# Patient Record
Sex: FEMALE | Race: WHITE | NOT HISPANIC OR LATINO | Employment: OTHER | URBAN - METROPOLITAN AREA
[De-identification: names, ages, dates, MRNs, and addresses within clinical notes are randomized per-mention and may not be internally consistent; named-entity substitution may affect disease eponyms.]

---

## 2023-04-06 DIAGNOSIS — M81.0 AGE-RELATED OSTEOPOROSIS WITHOUT CURRENT PATHOLOGICAL FRACTURE: Primary | ICD-10-CM

## 2023-04-07 RX ORDER — ALENDRONATE SODIUM 70 MG/1
TABLET ORAL
Qty: 12 TABLET | Refills: 2 | Status: SHIPPED | OUTPATIENT
Start: 2023-04-07 | End: 2023-12-11 | Stop reason: SDUPTHER

## 2023-07-12 ENCOUNTER — TELEPHONE (OUTPATIENT)
Dept: PRIMARY CARE | Facility: CLINIC | Age: 78
End: 2023-07-12
Payer: MEDICARE

## 2023-07-12 DIAGNOSIS — R15.9 INCONTINENCE OF FECES, UNSPECIFIED FECAL INCONTINENCE TYPE: Primary | ICD-10-CM

## 2023-08-06 ENCOUNTER — DOCUMENTATION (OUTPATIENT)
Dept: PRIMARY CARE | Facility: CLINIC | Age: 78
End: 2023-08-06
Payer: MEDICARE

## 2023-08-06 DIAGNOSIS — U07.1 COVID-19 VIRUS INFECTION: Primary | ICD-10-CM

## 2023-08-06 NOTE — PROGRESS NOTES
Pt tested positive for covid 19 this morning   Symptomatic Rx  with analgesics/ antipyretics and decongestants for sx relief.  Sx course, recovery and residual sx  ( lingering cough , fatigue etc.,) discussed.   Paxlovid discussed, MC SEs and to hold statin during duration of rx advised.   Renal function reviewed.  Discussed the sx , course and worsening sx that would need immediate attention requiring ER visit and hospitalization if needed    Discussed COVID-19 Pandemic best practices for avoidance,  wearing mask when leave the home, only leaving home for essential reasons, maintain social distancing, etc.     Pt's prime concern was her  who has MG and is under Rx .   At this time he does not have sx . Advised her to quarantine for 5- 7 days  and be on high alert for onset of sx for him .

## 2023-08-18 ENCOUNTER — TELEPHONE (OUTPATIENT)
Dept: PRIMARY CARE | Facility: CLINIC | Age: 78
End: 2023-08-18
Payer: MEDICARE

## 2023-08-18 DIAGNOSIS — B34.9 VIRAL SYNDROME: Primary | ICD-10-CM

## 2023-08-18 RX ORDER — METHYLPREDNISOLONE 4 MG/1
TABLET ORAL
Qty: 21 TABLET | Refills: 0 | Status: SHIPPED | OUTPATIENT
Start: 2023-08-18 | End: 2023-08-18 | Stop reason: SDUPTHER

## 2023-08-18 RX ORDER — METHYLPREDNISOLONE 4 MG/1
TABLET ORAL
Qty: 21 TABLET | Refills: 0 | Status: SHIPPED | OUTPATIENT
Start: 2023-08-18 | End: 2023-08-25

## 2023-09-19 ENCOUNTER — HOSPITAL ENCOUNTER (OUTPATIENT)
Dept: DATA CONVERSION | Facility: HOSPITAL | Age: 78
Discharge: HOME | End: 2023-09-19

## 2023-09-28 DIAGNOSIS — R15.2 INCONTINENCE OF FECES WITH FECAL URGENCY: Primary | ICD-10-CM

## 2023-09-28 DIAGNOSIS — R15.9 INCONTINENCE OF FECES WITH FECAL URGENCY: Primary | ICD-10-CM

## 2023-09-28 DIAGNOSIS — N39.46 URINARY INCONTINENCE, MIXED: ICD-10-CM

## 2023-10-05 DIAGNOSIS — N39.46 MIXED STRESS AND URGE URINARY INCONTINENCE: Primary | ICD-10-CM

## 2023-10-31 ENCOUNTER — TREATMENT (OUTPATIENT)
Dept: PHYSICAL THERAPY | Facility: CLINIC | Age: 78
End: 2023-10-31
Payer: MEDICARE

## 2023-10-31 DIAGNOSIS — N39.46 URINARY INCONTINENCE, MIXED: Primary | ICD-10-CM

## 2023-10-31 DIAGNOSIS — N39.46 MIXED STRESS AND URGE URINARY INCONTINENCE: ICD-10-CM

## 2023-10-31 DIAGNOSIS — R15.2 INCONTINENCE OF FECES WITH FECAL URGENCY: ICD-10-CM

## 2023-10-31 DIAGNOSIS — R15.9 INCONTINENCE OF FECES WITH FECAL URGENCY: ICD-10-CM

## 2023-10-31 PROCEDURE — 97530 THERAPEUTIC ACTIVITIES: CPT | Mod: GP

## 2023-10-31 ASSESSMENT — PAIN - FUNCTIONAL ASSESSMENT: PAIN_FUNCTIONAL_ASSESSMENT: 0-10

## 2023-10-31 ASSESSMENT — ENCOUNTER SYMPTOMS
DEPRESSION: 0
LOSS OF SENSATION IN FEET: 0
OCCASIONAL FEELINGS OF UNSTEADINESS: 0

## 2023-10-31 ASSESSMENT — PAIN SCALES - GENERAL: PAINLEVEL_OUTOF10: 0 - NO PAIN

## 2023-10-31 NOTE — PROGRESS NOTES
Physical Therapy Treatment    Patient Name: Jeanette Del Toro  MRN: [id number]  Today's Date: 10/31/2023  Time Calculation  Start Time: 1520  Stop Time: 1630  Time Calculation (min): 70 min  PT Therapeutic Procedures Time Entry  Therapeutic Activity Time Entry: 55  Visit # 2/10    Current Problem   1. Urinary incontinence, mixed        2. Mixed stress and urge urinary incontinence  Follow Up In Physical Therapy    Follow Up In Physical Therapy      3. Incontinence of feces with fecal urgency  Follow Up In Physical Therapy          Subjective   General    Nocturia continues 3x most nights. Still voids every 2-3 hours during the day. Sometimes more in morning.  Fiber 23-30 grams per day from food  Fecal smearing happening maybe 3-4x/week  PCP recommended Colace and fiber gummies. Seemed to make stool too soft.   She has increased water a little bit. Added a glass of water with dinner (10-12 oz)    Precautions:  Precautions  STEADI Fall Risk Score (The score of 4 or more indicates an increased risk of falling): 0  Pain   Pain Assessment: 0-10  Pain Score: 0 - No pain  Post Treatment Pain Level 0    Objective   Internal assessment of pelvic floor muscles (vaginal) completed in supine position with patient consent:  Observation:  Vulvar tissues mildly erythematic with moderate dryness c/w postmenopausal status, labia minora reabsorption  no perineal excursion with diaphragmatic breath,visible lift with cues with +adductor accessory, delayed relaxation, +bulge with cough    Internal:  Superficial layer mm   ++tender bilat bulbocav, STP  No tension noted    Deep layer:  ++tender R>L PC, OI  Min tension L>R    Muscle Excursion  Good - lift/ascent, fair descent    Strength  3+/5, delayed/difficult relaxation after contract    Prolapse  None observed; not formally assessed this date     Internal (rectal) assessment of pelvic floor mm in prone position with patient consent:    Observation: no perianal irritation    Anal wink  reflex: NT  External Anal spincter:  Pain no  Tone hypo  Strength fair  Relaxation WNL  Coordination WNL    Coccyx: hypomobile     Treatments:     Therapeutic Activity  Therapeutic Activity Performed: Yes  Therapeutic Activity 1: Review of eval findings from 9/19 and noted updates.  Therapeutic Activity 2: Discussed pt's fiber journal and management of stool consistency. Pt's PCP had previously recommended Colace as a stool softener and PT/patient discuss potential benefits of resuming this. Pt is is getting adequat fiber intake from diet (23-30 g daily) so advised she may want to hold off on fiber gummies at this time.  Therapeutic Activity 3: Internal (vaginal and rectal) pelvic floor mm assessments completed as noted above and pt educated in findings.       Assessment   Internal assessment of pfm reveals considerable pfm tenderness and delayed relaxation, as well as EAS weakness. Pt will benefit from continued strengthening of pfm being mindful of resting tone. Patient's postural deficits are likely contributing factors as well.        Plan:    Focus on breathwork and exercise next session. Review bowel emptying strategies.      Goals:   Active       PT LTGs       demos pelvic floor mm strength at least 3/5 with normal/complete relaxation after contract for improved continence of bladder and bowel        Start:  09/28/23    Expected End:  12/29/23            bart stool consistency 2-4  for improved efficiency of holding stool        Start:  09/28/23    Expected End:  12/29/23            bart I with progressed home exercise program with proper breath coordination for iAP management        Start:  09/28/23    Expected End:  12/29/23            report episodes of UI and FI reduced by 75% or more        Start:  09/28/23    Expected End:  12/29/23            report nocturia 2x or less       Start:  09/28/23    Expected End:  12/29/23                   PT Raulito arriaga I with exercises issued thus far with proper  breath coordination and no UI        Start:  09/28/23    Expected End:  10/31/23            consistently demos pelvic floor mm excursion in coordination with breath cycle        Start:  09/28/23    Expected End:  10/31/23            report sucess with urge suppression strategies 50% of the time with bowel/bladder urgency       Start:  09/28/23    Expected End:  10/31/23

## 2023-11-09 ENCOUNTER — TREATMENT (OUTPATIENT)
Dept: PHYSICAL THERAPY | Facility: CLINIC | Age: 78
End: 2023-11-09
Payer: MEDICARE

## 2023-11-09 DIAGNOSIS — N39.46 URINARY INCONTINENCE, MIXED: Primary | ICD-10-CM

## 2023-11-09 DIAGNOSIS — N39.46 MIXED STRESS AND URGE URINARY INCONTINENCE: ICD-10-CM

## 2023-11-09 DIAGNOSIS — R15.9 INCONTINENCE OF FECES WITH FECAL URGENCY: ICD-10-CM

## 2023-11-09 DIAGNOSIS — R15.2 INCONTINENCE OF FECES WITH FECAL URGENCY: ICD-10-CM

## 2023-11-09 PROCEDURE — 97530 THERAPEUTIC ACTIVITIES: CPT | Mod: GP

## 2023-11-09 PROCEDURE — 97110 THERAPEUTIC EXERCISES: CPT | Mod: GP

## 2023-11-09 ASSESSMENT — PAIN SCALES - GENERAL: PAINLEVEL_OUTOF10: 0 - NO PAIN

## 2023-11-09 ASSESSMENT — PAIN - FUNCTIONAL ASSESSMENT: PAIN_FUNCTIONAL_ASSESSMENT: 0-10

## 2023-11-09 NOTE — PROGRESS NOTES
Physical Therapy Treatment    Patient Name: Jeanette Del Toro  MRN: [id number]  Today's Date: 11/9/2023  Time Calculation  Start Time: 0930  Stop Time: 1030  Time Calculation (min): 60 min  PT Therapeutic Procedures Time Entry  Therapeutic Exercise Time Entry: 30  Therapeutic Activity Time Entry: 25  Visit # 3/10    Current Problem   1. Urinary incontinence, mixed  Follow Up In Physical Therapy      2. Mixed stress and urge urinary incontinence  Follow Up In Physical Therapy      3. Incontinence of feces with fecal urgency  Follow Up In Physical Therapy          Subjective   General    Taking Colace 1x/day. Her typical stool type is [place] 1. With Colace she is [place] 3-4 and very large. Pt finds this upsetting because they are so large and she stoped up the toilet.  She did not take Colace past 2 days because she had somewhere to be in the morning.   Nocturia 3-4x. This may be in part due to spouse waking during the night. Just a few sips of water every hour in evening.     Precautions:  Precautions  STEADI Fall Risk Score (The score of 4 or more indicates an increased risk of falling): 0  Pain   Pain Assessment: 0-10  Pain Score: 0 - No pain  Post Treatment Pain Level 0    Objective   Excellent exercise technique and breath strategies    Treatments:  Therapeutic Exercise  Therapeutic Exercise Performed: Yes  Therapeutic Exercise Activity 1: diaphragmatic breathing x10  Therapeutic Exercise Activity 2: diaphragmatic breathing with pfm drop x10  Therapeutic Exercise Activity 3: cobbler pose with diaphragmatic breathing  Therapeutic Exercise Activity 4: windshield wiper  Therapeutic Exercise Activity 5: cat cow x10  Therapeutic Exercise Activity 6: child's pose  Therapeutic Exercise Activity 7: horizontal abduction with green band supine and seated x10    Therapeutic Activity  Therapeutic Activity Performed: Yes  Therapeutic Activity 1: Lengthy review of patient's updated bowel movements with colace. Reviewed  "[place] chart and discussed \"normal\" consistency, toilet positioning, length of time sitting on toilet. Pt and PT agree pt will continue with Colace daily for now and monitor size, consistency and frequency of BMs.  Therapeutic Activity 2: Reviwed nocturia habits, including evening fluid intake.           Assessment   Assessment:    Pt demos excellent carryover with exercises and asks insightful questions. Pt appears to be having excellent results with stool softener, high fiber diet and exercise. She has concerns about size of stool, but agrees to stick with current plan and monitor for changes. Exercises currently focused on pfm relaxation for decreased bladder irritation and improved bowel emptying.    Plan:    Will reassess pfm next session.    OP EDUCATION:       Goals:   Active       PT LTGs       demos pelvic floor mm strength at least 3/5 with normal/complete relaxation after contract for improved continence of bladder and bowel        Start:  09/28/23    Expected End:  12/29/23            demos stool consistency 2-4  for improved efficiency of holding stool        Start:  09/28/23    Expected End:  12/29/23            demos I with progressed home exercise program with proper breath coordination for iAP management        Start:  09/28/23    Expected End:  12/29/23            report episodes of UI and FI reduced by 75% or more        Start:  09/28/23    Expected End:  12/29/23            report nocturia 2x or less       Start:  09/28/23    Expected End:  12/29/23                   PT STGs       demos I with exercises issued thus far with proper breath coordination and no UI  (Met)       Start:  09/28/23    Expected End:  10/31/23    Resolved:  11/09/23         consistently demos pelvic floor mm excursion in coordination with breath cycle  (Progressing)       Start:  09/28/23    Expected End:  11/30/23            report sucess with urge suppression strategies 50% of the time with bowel/bladder urgency " (Progressing)       Start:  09/28/23    Expected End:  11/30/23

## 2023-11-16 ENCOUNTER — TREATMENT (OUTPATIENT)
Dept: PHYSICAL THERAPY | Facility: CLINIC | Age: 78
End: 2023-11-16
Payer: MEDICARE

## 2023-11-16 DIAGNOSIS — N39.46 URINARY INCONTINENCE, MIXED: Primary | ICD-10-CM

## 2023-11-16 DIAGNOSIS — R15.2 INCONTINENCE OF FECES WITH FECAL URGENCY: ICD-10-CM

## 2023-11-16 DIAGNOSIS — N39.46 MIXED STRESS AND URGE URINARY INCONTINENCE: ICD-10-CM

## 2023-11-16 DIAGNOSIS — R15.9 INCONTINENCE OF FECES WITH FECAL URGENCY: ICD-10-CM

## 2023-11-16 PROCEDURE — 97530 THERAPEUTIC ACTIVITIES: CPT | Mod: GP

## 2023-11-16 PROCEDURE — 97110 THERAPEUTIC EXERCISES: CPT | Mod: GP

## 2023-11-16 ASSESSMENT — PAIN - FUNCTIONAL ASSESSMENT: PAIN_FUNCTIONAL_ASSESSMENT: 0-10

## 2023-11-16 ASSESSMENT — PAIN SCALES - GENERAL: PAINLEVEL_OUTOF10: 0 - NO PAIN

## 2023-11-16 NOTE — PROGRESS NOTES
"Physical Therapy Treatment    Patient Name: Jeanette Del Toro  MRN: [id number]  Today's Date: 11/16/2023  Time Calculation  Start Time: 0935  Stop Time: 1030  Time Calculation (min): 55 min  PT Therapeutic Procedures Time Entry  Therapeutic Exercise Time Entry: 30  Therapeutic Activity Time Entry: 23  Visit # 4/10    Current Problem   1. Urinary incontinence, mixed        2. Mixed stress and urge urinary incontinence  Follow Up In Physical Therapy      3. Incontinence of feces with fecal urgency            Subjective   General    Patient reports she is \"good.\" She continues to have larger bowel movements more often. When this happens, it is only 1x/day. Still having some days when BM is smaller and more frequent. This only happened 1-2x in past week. Thinks stool softener is helping.  Has not noted a big change with bladder, maybe longer stretches of sleep during the night. Intermittent success with urge suppression strategies for bladder.  Patient defers internal re-assessment this date.  Precautions:  Precautions  STEADI Fall Risk Score (The score of 4 or more indicates an increased risk of falling): 0  Pain   Pain Assessment: 0-10  Pain Score: 0 - No pain  Post Treatment Pain Level 0    Objective   Excellent exercise technique    Treatments:  Therapeutic Exercise  Therapeutic Exercise Performed: Yes  Therapeutic Exercise Activity 1: sidelying clam/reverse clam without resistance  Therapeutic Exercise Activity 2: sidelying open book x10 bilat  Therapeutic Exercise Activity 3: cobbler pose with diaphragmatic breathing  Therapeutic Exercise Activity 4: windshield wiper  Therapeutic Exercise Activity 5: cat cow x10  Therapeutic Exercise Activity 6: child's pose  Therapeutic Exercise Activity 7: horizontal abduction with green band supine and seated x10    Therapeutic Activity  Therapeutic Activity Performed: Yes  Therapeutic Activity 1: Review of bowel function, impact of stool softener  Therapeutic Activity 2: " Educated pt re: purpose of re-assessing pelvic floor internally. Reviewed impact of mm tension on bladder/bowel fuction and mm tenderness        Assessment   Assessment:    Pt demos and verbalizes excellent understanding of purpose of exercises and behavioral modification. Bowels are functioning better/ more regular, which should have positive impact on bladder. Pt will benefit from pfm re-assessment, then progression of exercises based on findings.     Plan:    Plan internal re-assessment next session. Review all goals. Review urge suppression /daytime voiding intervals.    OP EDUCATION:   As above    Goals:   Active       PT LTGs       demos pelvic floor mm strength at least 3/5 with normal/complete relaxation after contract for improved continence of bladder and bowel        Start:  09/28/23    Expected End:  12/29/23            demos stool consistency 2-4  for improved efficiency of holding stool        Start:  09/28/23    Expected End:  12/29/23            demos I with progressed home exercise program with proper breath coordination for iAP management        Start:  09/28/23    Expected End:  12/29/23            report episodes of UI and FI reduced by 75% or more        Start:  09/28/23    Expected End:  12/29/23            report nocturia 2x or less       Start:  09/28/23    Expected End:  12/29/23                   PT STGs       demos I with exercises issued thus far with proper breath coordination and no UI  (Met)       Start:  09/28/23    Expected End:  10/31/23    Resolved:  11/09/23         consistently demos pelvic floor mm excursion in coordination with breath cycle  (Progressing)       Start:  09/28/23    Expected End:  11/30/23            report sucess with urge suppression strategies 50% of the time with bowel/bladder urgency (Progressing)       Start:  09/28/23    Expected End:  11/30/23

## 2023-11-20 ENCOUNTER — TREATMENT (OUTPATIENT)
Dept: PHYSICAL THERAPY | Facility: CLINIC | Age: 78
End: 2023-11-20
Payer: MEDICARE

## 2023-11-20 DIAGNOSIS — R15.2 INCONTINENCE OF FECES WITH FECAL URGENCY: ICD-10-CM

## 2023-11-20 DIAGNOSIS — N39.46 URINARY INCONTINENCE, MIXED: Primary | ICD-10-CM

## 2023-11-20 DIAGNOSIS — R15.9 INCONTINENCE OF FECES WITH FECAL URGENCY: ICD-10-CM

## 2023-11-20 DIAGNOSIS — N39.46 MIXED STRESS AND URGE URINARY INCONTINENCE: ICD-10-CM

## 2023-11-20 DIAGNOSIS — M81.0 AGE-RELATED OSTEOPOROSIS WITHOUT CURRENT PATHOLOGICAL FRACTURE: ICD-10-CM

## 2023-11-20 PROCEDURE — 97530 THERAPEUTIC ACTIVITIES: CPT | Mod: GP

## 2023-11-20 PROCEDURE — 97140 MANUAL THERAPY 1/> REGIONS: CPT | Mod: GP

## 2023-11-20 PROCEDURE — 97110 THERAPEUTIC EXERCISES: CPT | Mod: GP

## 2023-11-20 ASSESSMENT — PAIN SCALES - GENERAL: PAINLEVEL_OUTOF10: 0 - NO PAIN

## 2023-11-20 ASSESSMENT — PAIN - FUNCTIONAL ASSESSMENT: PAIN_FUNCTIONAL_ASSESSMENT: 0-10

## 2023-11-20 NOTE — PROGRESS NOTES
Physical Therapy Treatment    Patient Name: Jeanette Del Toro  MRN: [id number]  Today's Date: 11/20/2023  Time Calculation  Start Time: 0930  Stop Time: 1030  Time Calculation (min): 60 min  PT Therapeutic Procedures Time Entry  Manual Therapy Time Entry: 5  Therapeutic Exercise Time Entry: 15  Therapeutic Activity Time Entry: 35  Visit # 5/10    Current Problem   1. Urinary incontinence, mixed        2. Mixed stress and urge urinary incontinence  Follow Up In Physical Therapy      3. Incontinence of feces with fecal urgency            Subjective   General    No significant changes since here last. She is having some success with urge suppression strategies. She has been going a little longer at night between voids (up to 4.5 hours last night). Daytime voiding intervals seem to vary, but definitely more frequent in the morning when she drinks more.  Leaking urine (small) on her way to bathroom.  Bowel movements are still large/more normal consistency, but now every day or every other. No smears/stains last week.    Precautions:  Precautions  STEADI Fall Risk Score (The score of 4 or more indicates an increased risk of falling): 0  Pain   Pain Assessment: 0-10  Pain Score: 0 - No pain  Post Treatment Pain Level 0    Objective   Internal assessment of pelvic floor muscles (vaginal) completed in supine position with patient consent.    Observation:  Vulvar tissue pink with mod dryness    Internal:  Superficial layer mm   +tender R>L bulbo/ischio    Deep layer:  +tender OI bilat L>R    Muscle Excursion  Good- lift/descent    Strength  3/5, good relaxation with cues    Prolapse  Not observed     Treatments:  Therapeutic Exercise  Therapeutic Exercise Performed: Yes  Therapeutic Exercise Activity 1: isolated pfm contraction with focus on both anterior and posterior pelvic floor  Therapeutic Exercise Activity 2: clam (green)/reverse clam  Therapeutic Exercise Activity 3: bridge with abduct moment and folcus  on    Therapeutic Activity  Therapeutic Activity Performed: Yes  Therapeutic Activity 1: review of short term goals, educated pt re: walker         Manual Therapy  Manual Therapy Performed: Yes  Manual Therapy Activity 1: brief ttp release L>R OI, variable hip rotation      Assessment   Assessment:    Progressing towards goals. Bowel emptying improved and less fecal smearing noted. Continues to have urinary urgency and UUI. Pelvic floor mm excursion improved overall, but still has first layer tenderness and OI irritation, which could be contributing to bladder irritation.    Plan:    Progress hip strengthening/pelvic floor strengthening    OP EDUCATION:   [place] daytime voiding intervals.    Goals:   Active       PT LTGs       demos pelvic floor mm strength at least 3/5 with normal/complete relaxation after contract for improved continence of bladder and bowel        Start:  09/28/23    Expected End:  12/29/23            demos stool consistency 2-4  for improved efficiency of holding stool        Start:  09/28/23    Expected End:  12/29/23            demos I with progressed home exercise program with proper breath coordination for iAP management        Start:  09/28/23    Expected End:  12/29/23            report episodes of UI and FI reduced by 75% or more        Start:  09/28/23    Expected End:  12/29/23            report nocturia 2x or less       Start:  09/28/23    Expected End:  12/29/23                   PT STGs       demos I with exercises issued thus far with proper breath coordination and no UI  (Met)       Start:  09/28/23    Expected End:  10/31/23    Resolved:  11/09/23         consistently demos pelvic floor mm excursion in coordination with breath cycle  (Met)       Start:  09/28/23    Expected End:  11/30/23    Resolved:  11/20/23         report sucess with urge suppression strategies 50% of the time with bowel/bladder urgency (Progressing)       Start:  09/28/23    Expected End:  12/15/23

## 2023-11-21 RX ORDER — ALENDRONATE SODIUM 70 MG/1
TABLET ORAL
Qty: 12 TABLET | Refills: 3 | OUTPATIENT
Start: 2023-11-21

## 2023-11-30 ENCOUNTER — TREATMENT (OUTPATIENT)
Dept: PHYSICAL THERAPY | Facility: CLINIC | Age: 78
End: 2023-11-30
Payer: MEDICARE

## 2023-11-30 DIAGNOSIS — N39.46 MIXED STRESS AND URGE URINARY INCONTINENCE: ICD-10-CM

## 2023-11-30 DIAGNOSIS — N39.46 URINARY INCONTINENCE, MIXED: Primary | ICD-10-CM

## 2023-11-30 DIAGNOSIS — R15.2 INCONTINENCE OF FECES WITH FECAL URGENCY: ICD-10-CM

## 2023-11-30 DIAGNOSIS — R15.9 INCONTINENCE OF FECES WITH FECAL URGENCY: ICD-10-CM

## 2023-11-30 PROCEDURE — 97110 THERAPEUTIC EXERCISES: CPT | Mod: GP

## 2023-11-30 PROCEDURE — 97530 THERAPEUTIC ACTIVITIES: CPT | Mod: GP

## 2023-11-30 ASSESSMENT — PAIN SCALES - GENERAL: PAINLEVEL_OUTOF10: 0 - NO PAIN

## 2023-11-30 ASSESSMENT — PAIN - FUNCTIONAL ASSESSMENT: PAIN_FUNCTIONAL_ASSESSMENT: 0-10

## 2023-11-30 NOTE — PROGRESS NOTES
Physical Therapy Treatment    Patient Name: Jeanette Del Toro  MRN: [id number]  Today's Date: 11/30/2023  Time Calculation  Start Time: 0930  Stop Time: 1030  Time Calculation (min): 60 min  PT Therapeutic Procedures Time Entry  Therapeutic Exercise Time Entry: 35  Therapeutic Activity Time Entry: 20  Visit # 6/10    Current Problem   1. Urinary incontinence, mixed        2. Mixed stress and urge urinary incontinence  Follow Up In Physical Therapy      3. Incontinence of feces with fecal urgency            Subjective   General    Doing really well. Some stress/being off track over holiday got bowels off track and she has been a little more constipated. Did not get to exercise for a few days when family in town. Getting back on track now. She is having some success with urge control/suppression, sometimes still has a few drops of UI. Smaller amount than it was. Nocturia 2-3 vs 3-4.  She feels overall, she is drinking more water during the day. She stops fluids usually after 6pm, goes to sleep around 10pm.    Precautions:  Precautions  STEADI Fall Risk Score (The score of 4 or more indicates an increased risk of falling): 0  Pain   Pain Assessment: 0-10  Pain Score: 0 - No pain  Post Treatment Pain Level 0    Objective   Cues for exercise technique on ball and with HEP -- overall very good, does demos some hip IR with sit to stand    Treatments:  Therapeutic Exercise  Therapeutic Exercise Performed: Yes  Therapeutic Exercise Activity 3: bridge with abduction (green) x15  Therapeutic Exercise Activity 4: Seated on 65cm ball : pricilla breath, alignment check, pelvic rock, roll, circles  Therapeutic Exercise Activity 5: Seated on 65 cm ball: thoracic rotation ball pass  Therapeutic Exercise Activity 6: Sit to stand from ball and chair, with and without band for abduction moment at hips/knee alignment  Therapeutic Exercise Activity 7: bridge with LAQ    Therapeutic Activity  Therapeutic Activity Performed: Yes  Therapeutic  Activity 1: Reviewed current symptom updates. Answered pt questions regarding why exercises she is doing could be impacting nocturia. Educated pt in role of cns downtraining, bladder re-training in reducing bladder irritation and thus reducing nighttime frequency.  Therapeutic Activity 2: Used analogy of sensitive alarm system to educate pt re: cns upregulation in terms of bladder overactivity/urgency             Assessment   Assessment:    Overall, patient is progressing slowly but as expected. Improved bowel emptying has positively impacted bladder function, reduced urgency and nocturia. Pt will benefit from continued strengthening and postural awareness exercises (posterior chain, glutes, pelvic floor) with attention to not increasing tone/tension of pelvic floor.    Plan:    Postural exercises/post chain strengthening    OP EDUCATION:       Goals:   Active       PT LTGs       demos pelvic floor mm strength at least 3/5 with normal/complete relaxation after contract for improved continence of bladder and bowel        Start:  09/28/23    Expected End:  12/29/23            demos stool consistency 2-4  for improved efficiency of holding stool        Start:  09/28/23    Expected End:  12/29/23            demos I with progressed home exercise program with proper breath coordination for iAP management        Start:  09/28/23    Expected End:  12/29/23            report episodes of UI and FI reduced by 75% or more        Start:  09/28/23    Expected End:  12/29/23            report nocturia 2x or less       Start:  09/28/23    Expected End:  12/29/23                   PT STGs       demos I with exercises issued thus far with proper breath coordination and no UI  (Met)       Start:  09/28/23    Expected End:  10/31/23    Resolved:  11/09/23         consistently demos pelvic floor mm excursion in coordination with breath cycle  (Met)       Start:  09/28/23    Expected End:  11/30/23    Resolved:  11/20/23         report  sucess with urge suppression strategies 50% of the time with bowel/bladder urgency (Progressing)       Start:  09/28/23    Expected End:  12/15/23

## 2023-12-06 ENCOUNTER — TELEPHONE (OUTPATIENT)
Dept: PRIMARY CARE | Facility: CLINIC | Age: 78
End: 2023-12-06
Payer: MEDICARE

## 2023-12-06 DIAGNOSIS — R94.6 ABNORMAL THYROID EXAM: ICD-10-CM

## 2023-12-06 DIAGNOSIS — R73.9 ELEVATED BLOOD SUGAR: ICD-10-CM

## 2023-12-06 DIAGNOSIS — E55.9 VITAMIN D DEFICIENCY: Primary | ICD-10-CM

## 2023-12-06 DIAGNOSIS — E78.2 MODERATE MIXED HYPERLIPIDEMIA NOT REQUIRING STATIN THERAPY: ICD-10-CM

## 2023-12-08 ENCOUNTER — LAB (OUTPATIENT)
Dept: LAB | Facility: LAB | Age: 78
End: 2023-12-08
Payer: MEDICARE

## 2023-12-08 DIAGNOSIS — E78.2 MODERATE MIXED HYPERLIPIDEMIA NOT REQUIRING STATIN THERAPY: ICD-10-CM

## 2023-12-08 DIAGNOSIS — R73.9 ELEVATED BLOOD SUGAR: ICD-10-CM

## 2023-12-08 DIAGNOSIS — E55.9 VITAMIN D DEFICIENCY: ICD-10-CM

## 2023-12-08 DIAGNOSIS — R94.6 ABNORMAL THYROID EXAM: ICD-10-CM

## 2023-12-08 LAB
25(OH)D3 SERPL-MCNC: 46 NG/ML (ref 30–100)
ALBUMIN SERPL BCP-MCNC: 4.1 G/DL (ref 3.4–5)
ALP SERPL-CCNC: 57 U/L (ref 33–136)
ALT SERPL W P-5'-P-CCNC: 17 U/L (ref 7–45)
ANION GAP SERPL CALC-SCNC: 12 MMOL/L (ref 10–20)
AST SERPL W P-5'-P-CCNC: 17 U/L (ref 9–39)
BILIRUB SERPL-MCNC: 0.7 MG/DL (ref 0–1.2)
BUN SERPL-MCNC: 16 MG/DL (ref 6–23)
CALCIUM SERPL-MCNC: 9.5 MG/DL (ref 8.6–10.6)
CHLORIDE SERPL-SCNC: 103 MMOL/L (ref 98–107)
CHOLEST SERPL-MCNC: 223 MG/DL (ref 0–199)
CHOLESTEROL/HDL RATIO: 2.8
CO2 SERPL-SCNC: 27 MMOL/L (ref 21–32)
CREAT SERPL-MCNC: 0.81 MG/DL (ref 0.5–1.05)
ERYTHROCYTE [DISTWIDTH] IN BLOOD BY AUTOMATED COUNT: 13 % (ref 11.5–14.5)
EST. AVERAGE GLUCOSE BLD GHB EST-MCNC: 111 MG/DL
GFR SERPL CREATININE-BSD FRML MDRD: 75 ML/MIN/1.73M*2
GLUCOSE SERPL-MCNC: 80 MG/DL (ref 74–99)
HBA1C MFR BLD: 5.5 %
HCT VFR BLD AUTO: 43.6 % (ref 36–46)
HDLC SERPL-MCNC: 80.4 MG/DL
HGB BLD-MCNC: 13.8 G/DL (ref 12–16)
LDLC SERPL CALC-MCNC: 129 MG/DL
MCH RBC QN AUTO: 29.3 PG (ref 26–34)
MCHC RBC AUTO-ENTMCNC: 31.7 G/DL (ref 32–36)
MCV RBC AUTO: 93 FL (ref 80–100)
NON HDL CHOLESTEROL: 143 MG/DL (ref 0–149)
NRBC BLD-RTO: 0 /100 WBCS (ref 0–0)
PLATELET # BLD AUTO: 263 X10*3/UL (ref 150–450)
POTASSIUM SERPL-SCNC: 4.6 MMOL/L (ref 3.5–5.3)
PROT SERPL-MCNC: 6.6 G/DL (ref 6.4–8.2)
RBC # BLD AUTO: 4.71 X10*6/UL (ref 4–5.2)
SODIUM SERPL-SCNC: 137 MMOL/L (ref 136–145)
TRIGL SERPL-MCNC: 69 MG/DL (ref 0–149)
TSH SERPL-ACNC: 0.99 MIU/L (ref 0.44–3.98)
VIT B12 SERPL-MCNC: 423 PG/ML (ref 211–911)
VLDL: 14 MG/DL (ref 0–40)
WBC # BLD AUTO: 5.3 X10*3/UL (ref 4.4–11.3)

## 2023-12-08 PROCEDURE — 85027 COMPLETE CBC AUTOMATED: CPT

## 2023-12-08 PROCEDURE — 84443 ASSAY THYROID STIM HORMONE: CPT

## 2023-12-08 PROCEDURE — 80061 LIPID PANEL: CPT

## 2023-12-08 PROCEDURE — 82607 VITAMIN B-12: CPT

## 2023-12-08 PROCEDURE — 82306 VITAMIN D 25 HYDROXY: CPT

## 2023-12-08 PROCEDURE — 80053 COMPREHEN METABOLIC PANEL: CPT

## 2023-12-08 PROCEDURE — 83036 HEMOGLOBIN GLYCOSYLATED A1C: CPT

## 2023-12-08 PROCEDURE — 36415 COLL VENOUS BLD VENIPUNCTURE: CPT

## 2023-12-09 NOTE — PROGRESS NOTES
"Subjective   Patient ID: Jeanette Del Toro is a 77 y.o. female who presents for Medicare Annual Wellness Visit Subsequent.        HPI   The patient reports that she is taking Fosamax for her osteoporosis as prescribed and is tolerating them well. She states that she has recovered well from COVID. She is following up with GI for her IBS.    Review of Systems  Constitutional: No fever or chills, No Night Sweats  Eyes: No Blurry Vision or Eye sight problems  ENT: No Nasal Discharge, Hoarseness, sore throat  Cardiovascular: no chest pain, no palpitations and no syncope.   Respiratory: no cough, no shortness of breath during exertion and no shortness of breath at rest.   Gastrointestinal: no abdominal pain, no nausea and no vomiting.   : No vaginal discharge, burning with urination, no blood in urine or stools  Skin: No Skin rashes or Lesions  Neuro: No Headache, no dizziness or Numbness or tingling  Psych: No Anxiety, depression or sleeping problems  Heme: No Easy bleeding or brusing.     Objective   /71   Pulse 58   Ht 1.727 m (5' 8\")   Wt 56.2 kg (124 lb)   SpO2 98%   BMI 18.85 kg/m²     Physical Exam  Constitutional: Alert and in no acute distress. Well developed, well nourished.   Head and Face: Head and face: Normal.    Eyes: Normal external exam.   Ears, Nose, Mouth, and Throat: External inspection of ears and nose: Normal.  Hearing: Normal.   Cardiovascular: Heart rate and rhythm were normal, normal S1 and S2. Pedal pulses: Normal. No peripheral edema.   Pulmonary: No respiratory distress. Clear bilateral breath sounds.   Musculoskeletal: No joint swelling seen, normal movements of all extremities. Range of motion: Normal.  Muscle strength/tone: Normal.    Skin: Normal skin color and pigmentation, normal skin turgor, and no rash.   Psychiatric: Judgment and insight: Intact. Mood and affect: Normal.      Lab Results   Component Value Date    WBC 5.3 12/08/2023    HGB 13.8 12/08/2023    HCT 43.6 " 12/08/2023     12/08/2023    CHOL 223 (H) 12/08/2023    TRIG 69 12/08/2023    HDL 80.4 12/08/2023    ALT 17 12/08/2023    AST 17 12/08/2023     12/08/2023    K 4.6 12/08/2023     12/08/2023    CREATININE 0.81 12/08/2023    BUN 16 12/08/2023    CO2 27 12/08/2023    TSH 0.99 12/08/2023    HGBA1C 5.5 12/08/2023       DIGITAL MAMM SCREENING  MRN: [id number]  Patient Name: WALLY BONNER     STUDY:  DIGITAL MAMM SCREENING W/ PARMJIT;  2/6/2023 4:04 pm     ACCESSION NUMBER(S):  [id number]     ORDERING CLINICIAN:  DARIELA TORRES     INDICATION:  Screening.     COMPARISON:  01/05/2022 01/03/2020     FINDINGS:  2D and tomosynthesis images were reviewed at 1 mm slice thickness.     The breast tissue is heterogeneously dense, which may obscure small  masses.   Benign calcifications are identified. no suspicious masses  or calcifications are identified.     IMPRESSION:  No mammographic evidence of malignancy.     BI-RADS CATEGORY:     Category: 2 - Benign.  Recommendation: 1 Year Screening.     For any future breast imaging appointments, please call [phone] ([phone]).                  Assessment/Plan   Diagnoses and all orders for this visit:  Medicare annual wellness visit, subsequent  -     Follow Up In [facility] - PCP - Medicare Annual; Future  Encounter for screening for malignant neoplasm of colon  -     Colonoscopy Screening; Average Risk Patient; Future  Need for vaccination  -     Pneumococcal conjugate vaccine 20-valent IM  Asymptomatic menopausal state  -     XR DEXA bone density; Future  Encounter for screening mammogram for breast cancer  -     BI mammo bilateral screening tomosynthesis; Future  Age-related osteoporosis without current pathological fracture  -     alendronate (Fosamax) 70 mg tablet; Take 1 tablet (70 mg) by mouth every 7 days. Take in the morning with a full glass of water, on an empty stomach, and do not take anything else by mouth or lie down for the next 30  min.  Protein-calorie malnutrition, unspecified severity (CMS/HCC)  Vitamin D deficiency  -     Vitamin B12; Future  -     Vitamin D 25-Hydroxy,Total (for eval of Vitamin D levels); Future  Elevated blood sugar  -     Hemoglobin A1C; Future  Abnormal thyroid exam  -     TSH with reflex to Free T4 if abnormal; Future  Moderate mixed hyperlipidemia not requiring statin therapy  -     CBC; Future  -     Comprehensive Metabolic Panel; Future  -     Lipid Panel; Future        Dear Jeanette Del Toro     It was my pleasure to take care of you today in the office. Below are the things we discussed today:    1. Immunizations: Yearly Flu shot is recommended. Up-to-date          a: COVID: Booster up-to-Date         b: Tetanus: Please get from the pharmacy          c: Shingrix: Up-to-date         d: Pneumovax: Up-to-date         e: Prevnar: Given today          F. RSV: Up-to-date     2. Blood Work: Reviewed   3. Seen your dentist twice a year  4. Yearly Eye exam is recommended    5. BMI: Normal  6: Diet recommendations:   Eat Clean, Try to have as many home cooked meals as possible  Avoid processed foods which contain excess calories, sugar, and sodium.    7. Exercise recommendations:   150 minutes a week to maintain your weight     If you have to loose weight, you need a better diet and exercise plan.     8. Supplements recommended:  a - Calcium 600 mg up to twice a day to get a total of 1200 mg. Each 8 oz of milk or yogurt or 1 oz of cheese, 1 Banana, 1 serving of green Leafy vegetable has about 300 mg of Calcium, so you may subtract that amount. Calcium citrate is the only acceptable supplement to take if you take an acid suppressing medication like Prilosec; otherwise Calcium carbonate is acceptable too (It can cause Constipation).   b - Vitamin D - 2000 IU daily     9. Please get your Living will / Advance directive completed if you do not have one already. Please make sure our office has a copy of the latest one.      10. Colonoscopy: Uptodate. Last done in May 2019, repeat in May 2024   11. Mammogram: Uptodate, ordered for Feb 2024   12. PAP: Not indicated   13. DEXA: Ordered for Jan 2024   14: Skin Check: Please see Dermatology once a year for a Skin Check.     15. Osteoporosis: Continue Fosamax. Drug holiday due Dec 2027.    16. Hyperlipidemia: LDL showed 129. Advised the patient to work on consuming a more plant based diet.      Follow up in one year for a Physical. Please call the office before your Physical to see if you need blood work completed prior to your physical.     Please call me if any questions arise from now until your next visit. I will call you after I am done seeing patients. A Doctor is always available by phone when the office is closed. Please feel free to call for help with any problem that you feel shouldn't wait until the office re-opens.     Scribe Attestation  By signing my name below, IValeria, Edel   attest that this documentation has been prepared under the direction and in the presence of Lali Corbin MD.

## 2023-12-11 ENCOUNTER — OFFICE VISIT (OUTPATIENT)
Dept: PRIMARY CARE | Facility: CLINIC | Age: 78
End: 2023-12-11
Payer: MEDICARE

## 2023-12-11 VITALS
SYSTOLIC BLOOD PRESSURE: 116 MMHG | WEIGHT: 124 LBS | HEIGHT: 68 IN | BODY MASS INDEX: 18.79 KG/M2 | OXYGEN SATURATION: 98 % | HEART RATE: 58 BPM | DIASTOLIC BLOOD PRESSURE: 71 MMHG

## 2023-12-11 DIAGNOSIS — R73.9 ELEVATED BLOOD SUGAR: ICD-10-CM

## 2023-12-11 DIAGNOSIS — Z12.31 ENCOUNTER FOR SCREENING MAMMOGRAM FOR BREAST CANCER: ICD-10-CM

## 2023-12-11 DIAGNOSIS — Z12.11 ENCOUNTER FOR SCREENING FOR MALIGNANT NEOPLASM OF COLON: ICD-10-CM

## 2023-12-11 DIAGNOSIS — E55.9 VITAMIN D DEFICIENCY: ICD-10-CM

## 2023-12-11 DIAGNOSIS — E78.2 MODERATE MIXED HYPERLIPIDEMIA NOT REQUIRING STATIN THERAPY: ICD-10-CM

## 2023-12-11 DIAGNOSIS — Z00.00 MEDICARE ANNUAL WELLNESS VISIT, SUBSEQUENT: Primary | ICD-10-CM

## 2023-12-11 DIAGNOSIS — Z23 NEED FOR VACCINATION: ICD-10-CM

## 2023-12-11 DIAGNOSIS — Z78.0 ASYMPTOMATIC MENOPAUSAL STATE: ICD-10-CM

## 2023-12-11 DIAGNOSIS — M81.0 AGE-RELATED OSTEOPOROSIS WITHOUT CURRENT PATHOLOGICAL FRACTURE: ICD-10-CM

## 2023-12-11 DIAGNOSIS — R94.6 ABNORMAL THYROID EXAM: ICD-10-CM

## 2023-12-11 DIAGNOSIS — E46 PROTEIN-CALORIE MALNUTRITION, UNSPECIFIED SEVERITY (MULTI): ICD-10-CM

## 2023-12-11 PROBLEM — G25.0 ESSENTIAL TREMOR: Status: ACTIVE | Noted: 2022-02-08

## 2023-12-11 PROBLEM — M72.2 PLANTAR FASCIITIS, RIGHT: Status: ACTIVE | Noted: 2023-11-22

## 2023-12-11 PROBLEM — K58.9 IRRITABLE BOWEL SYNDROME: Status: ACTIVE | Noted: 2022-02-08

## 2023-12-11 PROBLEM — L82.1 SEBORRHEIC KERATOSES: Status: ACTIVE | Noted: 2023-07-17

## 2023-12-11 PROBLEM — M54.17 LUMBOSACRAL RADICULOPATHY: Status: ACTIVE | Noted: 2022-02-08

## 2023-12-11 PROBLEM — L71.9 ROSACEA, UNSPECIFIED: Status: ACTIVE | Noted: 2023-07-17

## 2023-12-11 PROBLEM — Z85.828 HISTORY OF SKIN CANCER: Status: ACTIVE | Noted: 2021-03-25

## 2023-12-11 PROCEDURE — 90677 PCV20 VACCINE IM: CPT | Performed by: FAMILY MEDICINE

## 2023-12-11 PROCEDURE — 99213 OFFICE O/P EST LOW 20 MIN: CPT | Performed by: FAMILY MEDICINE

## 2023-12-11 PROCEDURE — 99397 PER PM REEVAL EST PAT 65+ YR: CPT | Performed by: FAMILY MEDICINE

## 2023-12-11 PROCEDURE — 1159F MED LIST DOCD IN RCRD: CPT | Performed by: FAMILY MEDICINE

## 2023-12-11 PROCEDURE — 1170F FXNL STATUS ASSESSED: CPT | Performed by: FAMILY MEDICINE

## 2023-12-11 PROCEDURE — G0009 ADMIN PNEUMOCOCCAL VACCINE: HCPCS | Performed by: FAMILY MEDICINE

## 2023-12-11 PROCEDURE — 1160F RVW MEDS BY RX/DR IN RCRD: CPT | Performed by: FAMILY MEDICINE

## 2023-12-11 PROCEDURE — 1036F TOBACCO NON-USER: CPT | Performed by: FAMILY MEDICINE

## 2023-12-11 PROCEDURE — 1126F AMNT PAIN NOTED NONE PRSNT: CPT | Performed by: FAMILY MEDICINE

## 2023-12-11 PROCEDURE — G0439 PPPS, SUBSEQ VISIT: HCPCS | Performed by: FAMILY MEDICINE

## 2023-12-11 RX ORDER — ALENDRONATE SODIUM 70 MG/1
70 TABLET ORAL
Qty: 12 TABLET | Refills: 3 | Status: SHIPPED | OUTPATIENT
Start: 2023-12-11 | End: 2024-04-05 | Stop reason: SDUPTHER

## 2023-12-11 RX ORDER — IPRATROPIUM BROMIDE 17 UG/1
2 AEROSOL, METERED RESPIRATORY (INHALATION) 4 TIMES DAILY
COMMUNITY
Start: 2023-07-03 | End: 2024-05-16 | Stop reason: ALTCHOICE

## 2023-12-11 RX ORDER — BIOTIN 10 MG
TABLET ORAL
COMMUNITY

## 2023-12-11 RX ORDER — CYCLOSPORINE 0.5 MG/ML
EMULSION OPHTHALMIC
COMMUNITY
Start: 2022-07-02

## 2023-12-11 ASSESSMENT — ACTIVITIES OF DAILY LIVING (ADL)
DRESSING: INDEPENDENT
DOING_HOUSEWORK: INDEPENDENT
BATHING: INDEPENDENT
MANAGING_FINANCES: INDEPENDENT
TAKING_MEDICATION: INDEPENDENT
GROCERY_SHOPPING: INDEPENDENT

## 2023-12-11 ASSESSMENT — COLUMBIA-SUICIDE SEVERITY RATING SCALE - C-SSRS
1. IN THE PAST MONTH, HAVE YOU WISHED YOU WERE DEAD OR WISHED YOU COULD GO TO SLEEP AND NOT WAKE UP?: NO
2. HAVE YOU ACTUALLY HAD ANY THOUGHTS OF KILLING YOURSELF?: NO

## 2023-12-11 ASSESSMENT — PATIENT HEALTH QUESTIONNAIRE - PHQ9
2. FEELING DOWN, DEPRESSED OR HOPELESS: NOT AT ALL
1. LITTLE INTEREST OR PLEASURE IN DOING THINGS: NOT AT ALL
SUM OF ALL RESPONSES TO PHQ9 QUESTIONS 1 AND 2: 0

## 2023-12-21 ENCOUNTER — TREATMENT (OUTPATIENT)
Dept: PHYSICAL THERAPY | Facility: CLINIC | Age: 78
End: 2023-12-21
Payer: MEDICARE

## 2023-12-21 DIAGNOSIS — R15.2 INCONTINENCE OF FECES WITH FECAL URGENCY: ICD-10-CM

## 2023-12-21 DIAGNOSIS — R15.9 INCONTINENCE OF FECES WITH FECAL URGENCY: ICD-10-CM

## 2023-12-21 DIAGNOSIS — N39.46 URINARY INCONTINENCE, MIXED: Primary | ICD-10-CM

## 2023-12-21 DIAGNOSIS — N39.46 MIXED STRESS AND URGE URINARY INCONTINENCE: ICD-10-CM

## 2023-12-21 PROCEDURE — 97530 THERAPEUTIC ACTIVITIES: CPT | Mod: GP

## 2023-12-21 PROCEDURE — 97110 THERAPEUTIC EXERCISES: CPT | Mod: GP

## 2023-12-21 PROCEDURE — 97140 MANUAL THERAPY 1/> REGIONS: CPT | Mod: GP

## 2023-12-21 ASSESSMENT — PAIN SCALES - GENERAL: PAINLEVEL_OUTOF10: 0 - NO PAIN

## 2023-12-21 ASSESSMENT — PAIN - FUNCTIONAL ASSESSMENT: PAIN_FUNCTIONAL_ASSESSMENT: 0-10

## 2023-12-21 NOTE — PROGRESS NOTES
Physical Therapy Treatment    Patient Name: Jeanette Del Toro  MRN: [id number]  Today's Date: 12/21/2023  Time Calculation  Start Time: 1301  Stop Time: 1400  Time Calculation (min): 59 min  PT Therapeutic Procedures Time Entry  Manual Therapy Time Entry: 20  Therapeutic Exercise Time Entry: 20  Therapeutic Activity Time Entry: 15  Visit # 7/10    Current Problem   1. Urinary incontinence, mixed        2. Mixed stress and urge urinary incontinence  Follow Up In Physical Therapy    Follow Up In Physical Therapy      3. Incontinence of feces with fecal urgency  Follow Up In Physical Therapy    Follow Up In Physical Therapy          Subjective   General    Never got bowels totally back to normal after Thanksgiving. Continues to be constipated. Back to [place] type 1 even with 3 colase per day. Thinks it is the IBS flaring up. BM every other or every third days. Maybe only 1-2 large Bms since here last 3 weeks ago. She does not have much of and urge to go, but sits on the toilet at her typical time of day and produces very small BM. She will have small amount of staining later in the day.    She is having colonoscopy sometime in spring. She has concern because of family history of rectal CA.  Nocturia is gradually decreasing. Urgency is more controllable.    Precautions:  Precautions  STEADI Fall Risk Score (The score of 4 or more indicates an increased risk of falling): 0    Pain   Pain Assessment: 0-10  Pain Score: 0 - No pain    Post Treatment Pain Level 0    Objective   Minimal abdominal wall tenderness L descending colon    Treatments:  Therapeutic Exercise  Therapeutic Exercise Performed: Yes  Therapeutic Exercise Activity 1: alternate knee to chest with breath  Therapeutic Exercise Activity 2: windshield wipers  Therapeutic Exercise Activity 3: 4 point cat/cow  Therapeutic Exercise Activity 4: 4 point tail wag  Therapeutic Exercise Activity 5: 4 point hip IR    Therapeutic Activity  Therapeutic Activity  "Performed: Yes  Therapeutic Activity 1: Reviewed current symptoms and changes in bowel habits.  Therapeutic Activity 2: Issued handout for \"regularity recipe\"  Therapeutic Activity 3: Educated patient re: mechanism of laxative vs fiber benefits    Manual Therapy  Manual Therapy Performed: Yes  Manual Therapy Activity 1: ILU massage: short circular strokes without free-up, long sweeping strokes with free up  Manual Therapy Activity 2: L side abdominal wall      Assessment   Assessment:    Pt notes improvement in urinary urge/UUI and nocturia, although constipation has worsened over past few weeks. Patient responds well to abdominal wall work and suggestions for using regularity recipe. Possible that slight dietary changes over past few weeks have contributed, as pt has IbS and is sensitive to changes.    Plan:    Add post chain strengthening for postural improvement. Monitor changes in bowel health/regularity and address as needed.    OP EDUCATION:       Goals:   Active       PT LTGs       demos pelvic floor mm strength at least 3/5 with normal/complete relaxation after contract for improved continence of bladder and bowel        Start:  09/28/23    Expected End:  02/18/24            demos stool consistency 2-4  for improved efficiency of holding stool        Start:  09/28/23    Expected End:  02/18/24            demos I with progressed home exercise program with proper breath coordination for iAP management        Start:  09/28/23    Expected End:  02/18/24            report episodes of UI and FI reduced by 75% or more        Start:  09/28/23    Expected End:  02/18/24            report nocturia 2x or less       Start:  09/28/23    Expected End:  02/18/24                  Resolved       PT STGs       demos I with exercises issued thus far with proper breath coordination and no UI  (Met)       Start:  09/28/23    Expected End:  10/31/23    Resolved:  11/09/23         consistently demos pelvic floor mm excursion in " coordination with breath cycle  (Met)       Start:  09/28/23    Expected End:  11/30/23    Resolved:  11/20/23         report sucess with urge suppression strategies 50% of the time with bowel/bladder urgency (Met)       Start:  09/28/23    Expected End:  12/15/23    Resolved:  12/21/23

## 2024-01-03 ENCOUNTER — TREATMENT (OUTPATIENT)
Dept: PHYSICAL THERAPY | Facility: CLINIC | Age: 79
End: 2024-01-03
Payer: MEDICARE

## 2024-01-03 DIAGNOSIS — N39.46 URINARY INCONTINENCE, MIXED: Primary | ICD-10-CM

## 2024-01-03 DIAGNOSIS — R15.2 INCONTINENCE OF FECES WITH FECAL URGENCY: ICD-10-CM

## 2024-01-03 DIAGNOSIS — N39.46 MIXED STRESS AND URGE URINARY INCONTINENCE: ICD-10-CM

## 2024-01-03 DIAGNOSIS — R15.9 INCONTINENCE OF FECES WITH FECAL URGENCY: ICD-10-CM

## 2024-01-03 PROCEDURE — 97110 THERAPEUTIC EXERCISES: CPT | Mod: GP

## 2024-01-03 PROCEDURE — 97140 MANUAL THERAPY 1/> REGIONS: CPT | Mod: GP

## 2024-01-03 PROCEDURE — 97530 THERAPEUTIC ACTIVITIES: CPT | Mod: GP

## 2024-01-03 ASSESSMENT — PAIN - FUNCTIONAL ASSESSMENT: PAIN_FUNCTIONAL_ASSESSMENT: 0-10

## 2024-01-03 ASSESSMENT — PAIN SCALES - GENERAL: PAINLEVEL_OUTOF10: 0 - NO PAIN

## 2024-01-03 NOTE — PROGRESS NOTES
Physical Therapy Treatment    Patient Name: Jeanette Del Toro  MRN: [id number]  Today's Date: 1/3/2024  Time Calculation  Start Time: 1530  Stop Time: 1630  Time Calculation (min): 60 min  PT Therapeutic Procedures Time Entry  Manual Therapy Time Entry: 15  Therapeutic Exercise Time Entry: 23  Therapeutic Activity Time Entry: 15  Visit # 8/10    Current Problem   1. Urinary incontinence, mixed        2. Mixed stress and urge urinary incontinence  Follow Up In Physical Therapy      3. Incontinence of feces with fecal urgency  Follow Up In Physical Therapy          Subjective   General   Continues to be very constipated. Tried the regularity recipe for 3 days and was bloated, cramping, and didn't feel it helped. Now taking 3 colase at night. Very conscious about fiber. None of it is helping. Back to [place] type 1 stool. Had BM yesterday and today, but prior to yesterday it was a 5 day stretch.  Feels IBS is flared up. Has been traveling over the holidays. No episodes of fecal staining, only one episode of loss of stool.     Precautions:  Precautions  STEADI Fall Risk Score (The score of 4 or more indicates an increased risk of falling): 0  Pain   Pain Assessment: 0-10  Pain Score: 0 - No pain    Post Treatment Pain Level 0    Objective   Very good exercise technique. No abdominal wall distention noted.     Treatments:  Therapeutic Exercise  Therapeutic Exercise Performed: Yes  Therapeutic Exercise Activity 1: seated on 65 cm ball: pricilla breath with pfm relaxation, pelvic circles cw/ccw  Therapeutic Exercise Activity 2: seated on 65 cm ball: rotation unilat with green band x10  Therapeutic Exercise Activity 3: seated on 65 cm ball: scap retract green band x10  Therapeutic Exercise Activity 4: sit to stand from 65 cm ball x10  Therapeutic Exercise Activity 5: 4 point cat/cow  Therapeutic Exercise Activity 6: 4 point tail wag    Therapeutic Activity  Therapeutic Activity Performed: Yes  Therapeutic Activity 1: Reviewed  current symptoms and constipation management strategies. Discussed impacts of traveling and stress on GI system and constipation       Manual Therapy  Manual Therapy Performed: Yes  Manual Therapy Activity 1: Patient positioned supine with small bolster. Soft tissue mobilization to abdominal wall with skin rolling and multiplanar mfr.    Assessment   Assessment:    Pt is focused on constipation this date, as it has been very bothersome. Suspect that travel/changes in routine have contributed. Patient continues to have excellent self awareness, good dietary habits and exercise consistency. IBS is likely a contributing factor.     Plan:    Consider reassessment of pelvic floor mm rectally.    OP EDUCATION:   Continue to monitor fiber and water intake and continue with exercises.    Goals:   Active       PT LTGs       demos pelvic floor mm strength at least 3/5 with normal/complete relaxation after contract for improved continence of bladder and bowel        Start:  09/28/23    Expected End:  02/18/24            demos stool consistency 2-4  for improved efficiency of holding stool        Start:  09/28/23    Expected End:  02/18/24            demos I with progressed home exercise program with proper breath coordination for iAP management        Start:  09/28/23    Expected End:  02/18/24            report episodes of UI and FI reduced by 75% or more        Start:  09/28/23    Expected End:  02/18/24            report nocturia 2x or less       Start:  09/28/23    Expected End:  02/18/24                  Resolved       PT STGs       demos I with exercises issued thus far with proper breath coordination and no UI  (Met)       Start:  09/28/23    Expected End:  10/31/23    Resolved:  11/09/23         consistently demos pelvic floor mm excursion in coordination with breath cycle  (Met)       Start:  09/28/23    Expected End:  11/30/23    Resolved:  11/20/23         report sucess with urge suppression strategies 50% of the  time with bowel/bladder urgency (Met)       Start:  09/28/23    Expected End:  12/15/23    Resolved:  12/21/23

## 2024-01-11 ENCOUNTER — TREATMENT (OUTPATIENT)
Dept: PHYSICAL THERAPY | Facility: CLINIC | Age: 79
End: 2024-01-11
Payer: MEDICARE

## 2024-01-11 ENCOUNTER — APPOINTMENT (OUTPATIENT)
Dept: RADIOLOGY | Facility: CLINIC | Age: 79
End: 2024-01-11
Payer: MEDICARE

## 2024-01-11 DIAGNOSIS — R15.9 INCONTINENCE OF FECES WITH FECAL URGENCY: ICD-10-CM

## 2024-01-11 DIAGNOSIS — R15.2 INCONTINENCE OF FECES WITH FECAL URGENCY: ICD-10-CM

## 2024-01-11 DIAGNOSIS — N39.46 URINARY INCONTINENCE, MIXED: ICD-10-CM

## 2024-01-11 PROCEDURE — 97140 MANUAL THERAPY 1/> REGIONS: CPT | Mod: GP

## 2024-01-11 PROCEDURE — 97530 THERAPEUTIC ACTIVITIES: CPT | Mod: GP

## 2024-01-11 PROCEDURE — 97110 THERAPEUTIC EXERCISES: CPT | Mod: GP

## 2024-01-11 ASSESSMENT — PAIN - FUNCTIONAL ASSESSMENT: PAIN_FUNCTIONAL_ASSESSMENT: 0-10

## 2024-01-11 ASSESSMENT — PAIN SCALES - GENERAL: PAINLEVEL_OUTOF10: 0 - NO PAIN

## 2024-01-11 NOTE — PROGRESS NOTES
Physical Therapy Treatment    Patient Name: Jeanette Del Toro  MRN: [id number]  Today's Date: 1/11/2024  Time Calculation  Start Time: 1500  Stop Time: 1600  Time Calculation (min): 60 min  PT Therapeutic Procedures Time Entry  Manual Therapy Time Entry: 10  Therapeutic Exercise Time Entry: 15  Therapeutic Activity Time Entry: 30  Visit # 9/10    Current Problem   1. Urinary incontinence, mixed  Follow Up In Physical Therapy      2. Incontinence of feces with fecal urgency  Follow Up In Physical Therapy          Subjective   General    Bowels have been better. She decided to stop taking Colace and resume fiber gummies. She has had improved bowel movements this week. Reports nocturia 2x most nights. Still some UI during the night. It is improved during the day, but not where she would like it to be, 25% better. No episodes of FI, one two episodes of staining.    Precautions:  Precautions  STEADI Fall Risk Score (The score of 4 or more indicates an increased risk of falling): 0  Pain   Pain Assessment: 0-10  Pain Score: 0 - No pain  Post Treatment Pain Level 0    Objective   Excellent exercise technique  +tender R side abdominal wall    Treatments:  Therapeutic Exercise  Therapeutic Exercise Performed: Yes  Therapeutic Exercise Activity 1: pricilla breathing with options: corpse pose, hip IR, grounding  Therapeutic Exercise Activity 2: windshield wipers  Therapeutic Exercise Activity 3: iso hip flexion with ball x10 bilat  Therapeutic Exercise Activity 4: bridging with band press    Therapeutic Activity  Therapeutic Activity Performed: Yes  Therapeutic Activity 1: Reviewed current morning plan / bowel ritual. Discussed use of fiber gummies in evening and possibility of switching to a different time of day as needed. Reviewed the role of cns downtraining in helping with regular bowel function as well as urinary urgency and UI  Therapeutic Activity 2: Reviewed goals and educated patient in potential benefits of re-assessing  pelvic floor mm prior to final session to ensure improving coordination and mm function.  Therapeutic Activity 3: Educated re: exercises/movement as a benefit digestion and nervous system training         Manual Therapy  Manual Therapy Performed: Yes  Manual Therapy Activity 1: Supine with knees over pillow: ILU massage, skin rolling, MFR over remote abdominal scar    Assessment   Assessment:    Progressing towards remaining goals, as noted this date. Patient continues to have some episodes of urinary urgency and UUI, which may improved with improved bowel function.     Plan:    Issue handout for isometric hip flexion.    OP EDUCATION:   Continue with exercises, divide into two groups and alternate days if time is an issue    Goals:   Active       PT LTGs       demos pelvic floor mm strength at least 3/5 with normal/complete relaxation after contract for improved continence of bladder and bowel        Start:  09/28/23    Expected End:  02/18/24            demos stool consistency 2-4  for improved efficiency of holding stool  (Met)       Start:  09/28/23    Expected End:  02/18/24    Resolved:  01/11/24         demos I with progressed home exercise program with proper breath coordination for iAP management  (Met)       Start:  09/28/23    Expected End:  02/18/24    Resolved:  01/11/24         report episodes of UI and FI reduced by 75% or more  (Progressing)       Start:  09/28/23    Expected End:  02/18/24            report nocturia 2x or less (Met)       Start:  09/28/23    Expected End:  02/18/24    Resolved:  01/11/24               Resolved       PT STGs       demos I with exercises issued thus far with proper breath coordination and no UI  (Met)       Start:  09/28/23    Expected End:  10/31/23    Resolved:  11/09/23         consistently demos pelvic floor mm excursion in coordination with breath cycle  (Met)       Start:  09/28/23    Expected End:  11/30/23    Resolved:  11/20/23         report sucess with urge  suppression strategies 50% of the time with bowel/bladder urgency (Met)       Start:  09/28/23    Expected End:  12/15/23    Resolved:  12/21/23

## 2024-01-17 NOTE — PROGRESS NOTES
Subjective     Jeanette Del Toro is a 78 y.o. female who presents for the following: Skin Check.  She occasional and intermittent pimple-like breakouts on her face.  She denies any new, changing, or concerning skin lesions since her last visit; no bleeding, itching, or burning lesions.      Review of Systems:  No other skin or systemic complaints other than what is documented elsewhere in the note.    The following portions of the chart were reviewed this encounter and updated as appropriate:       Skin Cancer History  No skin cancer on file.    Specialty Problems          Dermatology Problems    History of skin cancer    Rosacea, unspecified    Seborrheic keratoses       Past Dermatologic / Past Relevant Medical History:    - history of BCC on right temporal hairline diagnosed on 5/17/21 s/p Mohs surgery by Dr. Madden on 7/27/21  - BCC on left temporal hairline diagnosed at initial visit on 2/1/21 s/p Mohs surgery by Dr. 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 Johnson on 11/18/22  - compound melanocytic neoplasm in actinically damaged skin on right lateral lower cheek diagnosed on 1/9/23 s/p re-excision with 5-mm margins by Dr. Johnson on 2/17/23  - mildly dysplastic junctional nevus on right posterior lateral proximal leg diagnosed on 7/17/23 s/p re-shave on 1/18/24  - no history of melanoma     Family History:    Parents and siblings - nonmelanoma skin cancers  No family history of melanoma    Social History:    The patient is retired from working as an  in [employer] and then at [employer]; she was seen with her  in the office again today    Allergies:   Terbinafine    Current Medications / CAM's:    Current Outpatient Medications:     alendronate (Fosamax) 70 mg tablet, Take 1 tablet (70 mg) by mouth every 7 days. Take in the morning with a full glass of water, on an empty stomach, and do not take anything else by mouth or lie down for the next 30 min., Disp: 12 tablet, Rfl: 3    Atrovent HFA 17 mcg/actuation inhaler, Inhale 2 puffs 4 times a day., Disp: , Rfl:     biotin 10 mg tablet, Take by mouth., Disp: , Rfl:     calcium-D3-mag oxide-C-K2-min 333 mg-8.3 mcg-116.7 mg capsule, Take 1 capsule by mouth 2 times a day., Disp: , Rfl:     cetirizine (ZyrTEC) 10 mg capsule, Take by mouth., Disp: , Rfl:     cycloSPORINE (Restasis) 0.05 % ophthalmic emulsion, , Disp: , Rfl:     DOCOSAHEXAENOIC ACID ORAL, Take by mouth once daily., Disp: , Rfl:     metroNIDAZOLE (Metrocream) 0.75 % cream, Apply twice daily to affected areas of face, Disp: 45 g, Rfl: 11     Objective   Well appearing patient in no apparent distress; mood and affect are within normal limits.    A full examination was performed including scalp, face, eyes, ears, nose, lips, neck, chest, axillae, abdomen, back, bilateral upper extremities, and bilateral lower extremities. All findings within normal limits unless otherwise noted below.    Assessment/Plan   1. Neoplasm of uncertain behavior of skin  Right Posterior Lateral Proximal Leg  Pink scar at recent biopsy site          Shave removal    Lesion length (cm):  1.3  Margin per side (cm):  0.2  Lesion diameter (cm):  1.7  Informed consent: discussed and consent obtained    Timeout: patient name, date of birth, surgical site, and procedure verified    Procedure prep:  Patient was prepped and draped  Anesthesia: the lesion was anesthetized in a standard fashion    Anesthetic:  1% lidocaine w/ epinephrine 1-100,000 local infiltration  Instrument used: flexible razor blade    Hemostasis achieved with: aluminum chloride    Outcome: patient tolerated procedure well     Post-procedure details: sterile dressing applied and wound care instructions given    Dressing type: bandage and petrolatum      Staff Communication: Dermatology Local Anesthesia: 1 % Lidocaine / Epinephrine - Amount:0.5ml    Specimen 1 - Dermatopathology- DERM LAB  Differential Diagnosis: DN; re-shave  Check Margins Yes/No?:    Comments:    Dermpath Lab: Routine Histopathology (formalin-fixed tissue)    Biopsy-proven mildly dysplastic junctional nevus - right posterior lateral proximal leg, present on the deep and peripheral margins.  The atypical nature of this dysplastic nevus, its presence on the margins, and management options were discussed etensively with the patient in the office today.  Given the dysplastic nature of this nevus and its presence on the margin, I recommend re-excision of this lesion to ensure complete removal.  After discussing the risks and benefits of various options for excision, including horizontal removal via shave technique versus full-thickness surgical re-excision, the patient expressed understanding and wishes to undergo horizontal removal of this lesion via shave technique today.    2. Actinic keratosis (16)  Head - Anterior (Face) (16)  Scattered on the patient's face, there are multiple erythematous, gritty, scaly macules     Actinic Keratoses -scattered on face.  The pre-cancerous nature of these lesions and treatment options were discussed with the patient today.  At this time, I recommend treatment with liquid nitrogen cryotherapy.  The patient expressed understanding, is in agreement with this plan, and wishes to proceed with cryotherapy today.    Destr of lesion - Head - Anterior (Face)  Complexity: simple    Destruction method: cryotherapy    Informed consent: discussed and consent obtained    Lesion destroyed using liquid nitrogen: Yes    Cryotherapy cycles:  1  Outcome: patient tolerated procedure well with no complications    Post-procedure details: wound care  instructions given      3. Melanocytic nevus of trunk  Scattered on the patient's face, neck, trunk, and extremities, there are multiple small, round- to oval-shaped, brown-pigmented and pink-colored, symmetric, uniform-appearing macules and dome-shaped papules    Clinically benign- to slightly atypical-appearing nevi - the clinically benign- to slightly atypical-appearing nature of the patient's nevi was discussed with the patient today.  None of the patient's nevi meet threshold for biopsy today.  I emphasized the importance of performing monthly self-skin exams using the ABCDs of monitoring moles, which were reviewed with the patient today and an informational hand-out provided.  I also emphasized the importance of sun avoidance and sun protection with daily sunscreen use.  The patient expressed understanding and is in agreement with this plan.    4. Seborrheic keratosis  Scattered on the patient's face, neck, trunk, and extremities, there are multiple tan- to light brown-colored, hyperkeratotic, stuck-on appearing papules of varying size and shape    Seborrheic Keratoses - the benign nature of these lesions was discussed with the patient today and reassurance provided.  No treatment is medically indicated for these lesions at this time.    5. Lentigo  Photodistributed  Multiple tan- to light brown-colored, round- to oval-shaped, symmetric and uniform-appearing macules and small patches consistent with lentigines scattered in sun-exposed areas.    Solar Lentigines and photodamage.  The clinically benign-appearing nature of these lesions and their relation to chronic sun exposure were discussed with the patient today and reassurance provided.  None of these lesions meet threshold for biopsy today, and thus no treatment is medically indicated for these lesions at this time.  The signs and symptoms of skin cancer were reviewed and the patient was advised to practice sun protection and sun avoidance, use daily  sunscreen, and perform regular self skin exams.  The patient was instructed to monitor these lesions for any changes, such as in size, shape, or color, or associated symptoms and to call our office to schedule a return visit for re-evaluation if any such changes or symptoms are noticed in the future.  The patient expressed understanding and is in agreement with this plan.    6. Rosacea  Head - Anterior (Face)  On the patient's face, most prominent over the bilateral medial cheeks and nose, there is moderate underlying erythema with several overlying telangiectasias and a few scattered erythematous, inflammatory papules     Rosacea -flare on face; papulo-pustular type.  The chronic and intermittently flaring nature of this condition and treatment options were discussed extensively with the patient today.  At this time, I recommend topical therapy with MetroCream 0.75%, which the patient was instructed to apply twice daily to the affected areas of the face.  I also discussed the various triggers of rosacea with the patient today, especially sun exposure, and emphasized the importance of trigger avoidance, particularly sun avoidance and sun protection with daily sunscreen use.  The risks, benefits, and side effects of this medication were discussed.  The patient expressed understanding and is in agreement with this plan.    metroNIDAZOLE (Metrocream) 0.75 % cream - Head - Anterior (Face)  Apply twice daily to affected areas of face    7. History of nonmelanoma skin cancer  On the patient's left temporal hairline, right temporal hairline, right jawline, and right lateral lower cheek, there are well-healed scars with no evidence of recurrent growth on exam today.    History of basal cell carcinomas, dysplastic nevi, and actinic keratoses and photodamage.  There is no evidence of recurrence on exam today.  The signs and symptoms of skin cancer were reviewed and the patient was advised to practice sun protection and sun  avoidance, use daily sunscreen, and perform regular self skin exams.  I will have the patient return to our office in 4 to 6 months for routine follow-up and skin exam, and the patient was instructed to call our office should the patient notice any new, changing, symptomatic, or otherwise concerning skin lesions before then.  The patient expressed understanding and is in agreement with this plan.    8. Diffuse photodamage of skin  Photodistributed  Diffuse photodamage with actinic changes with telangiectasia and mottled pigmentation in sun-exposed areas.    Photodamage.  The signs and symptoms of skin cancer were reviewed and the patient was advised to practice sun protection and sun avoidance, use daily sunscreen, and perform regular self skin exams.  Sun protection was discussed, including avoiding the mid-day sun, wearing a sunscreen with SPF at least 50, and stressing the need for reapplication of sunscreen and applying more than they think they need.

## 2024-01-18 ENCOUNTER — OFFICE VISIT (OUTPATIENT)
Dept: DERMATOLOGY | Facility: CLINIC | Age: 79
End: 2024-01-18
Payer: MEDICARE

## 2024-01-18 DIAGNOSIS — Z85.828 HISTORY OF NONMELANOMA SKIN CANCER: ICD-10-CM

## 2024-01-18 DIAGNOSIS — L71.9 ROSACEA: ICD-10-CM

## 2024-01-18 DIAGNOSIS — D22.5 MELANOCYTIC NEVUS OF TRUNK: ICD-10-CM

## 2024-01-18 DIAGNOSIS — L57.0 ACTINIC KERATOSIS: ICD-10-CM

## 2024-01-18 DIAGNOSIS — L82.1 SEBORRHEIC KERATOSIS: ICD-10-CM

## 2024-01-18 DIAGNOSIS — D48.5 NEOPLASM OF UNCERTAIN BEHAVIOR OF SKIN: Primary | ICD-10-CM

## 2024-01-18 DIAGNOSIS — L81.4 LENTIGO: ICD-10-CM

## 2024-01-18 DIAGNOSIS — L57.8 DIFFUSE PHOTODAMAGE OF SKIN: ICD-10-CM

## 2024-01-18 PROCEDURE — 88305 TISSUE EXAM BY PATHOLOGIST: CPT | Performed by: DERMATOLOGY

## 2024-01-18 PROCEDURE — 1160F RVW MEDS BY RX/DR IN RCRD: CPT | Performed by: DERMATOLOGY

## 2024-01-18 PROCEDURE — 99214 OFFICE O/P EST MOD 30 MIN: CPT | Performed by: DERMATOLOGY

## 2024-01-18 PROCEDURE — 17004 DESTROY PREMAL LESIONS 15/>: CPT | Performed by: DERMATOLOGY

## 2024-01-18 PROCEDURE — 11302 SHAVE SKIN LESION 1.1-2.0 CM: CPT | Performed by: DERMATOLOGY

## 2024-01-18 PROCEDURE — 1126F AMNT PAIN NOTED NONE PRSNT: CPT | Performed by: DERMATOLOGY

## 2024-01-18 PROCEDURE — 1036F TOBACCO NON-USER: CPT | Performed by: DERMATOLOGY

## 2024-01-18 PROCEDURE — 1159F MED LIST DOCD IN RCRD: CPT | Performed by: DERMATOLOGY

## 2024-01-18 ASSESSMENT — DERMATOLOGY PATIENT ASSESSMENT
DO YOU HAVE IRREGULAR MENSTRUAL CYCLES: NO
ARE YOU AN ORGAN TRANSPLANT RECIPIENT: NO
DO YOU USE A TANNING BED: NO
DO YOU HAVE ANY NEW OR CHANGING LESIONS: NO
ARE YOU TRYING TO GET PREGNANT: NO
DO YOU USE SUNSCREEN: OCCASIONALLY
ARE YOU ON BIRTH CONTROL: NO

## 2024-01-18 ASSESSMENT — DERMATOLOGY QUALITY OF LIFE (QOL) ASSESSMENT: ARE THERE EXCLUSIONS OR EXCEPTIONS FOR THE QUALITY OF LIFE ASSESSMENT: NO

## 2024-01-18 ASSESSMENT — ITCH NUMERIC RATING SCALE: HOW SEVERE IS YOUR ITCHING?: 0

## 2024-01-20 RX ORDER — METRONIDAZOLE 7.5 MG/G
CREAM TOPICAL
Qty: 45 G | Refills: 11 | Status: SHIPPED | OUTPATIENT
Start: 2024-01-20

## 2024-01-23 LAB
LABORATORY COMMENT REPORT: NORMAL
PATH REPORT.FINAL DX SPEC: NORMAL
PATH REPORT.GROSS SPEC: NORMAL
PATH REPORT.MICROSCOPIC SPEC OTHER STN: NORMAL
PATH REPORT.RELEVANT HX SPEC: NORMAL
PATH REPORT.TOTAL CANCER: NORMAL

## 2024-01-31 ENCOUNTER — TELEPHONE (OUTPATIENT)
Dept: DERMATOLOGY | Facility: CLINIC | Age: 79
End: 2024-01-31
Payer: MEDICARE

## 2024-01-31 NOTE — TELEPHONE ENCOUNTER
Pt left message that her bx site has redness around area like a rectangular shape , warm to the touch , some drainage clear , no pain - I returned call to pt to make her aware legs take a long time to heal , continue with vaseline and cover , use some coban to make a bit of a pressure bandage , and elevate legs when sitting .. She will call back in a few days if worsens ..

## 2024-02-06 ENCOUNTER — TREATMENT (OUTPATIENT)
Dept: PHYSICAL THERAPY | Facility: CLINIC | Age: 79
End: 2024-02-06
Payer: MEDICARE

## 2024-02-06 DIAGNOSIS — N39.46 MIXED STRESS AND URGE URINARY INCONTINENCE: ICD-10-CM

## 2024-02-06 DIAGNOSIS — R15.9 INCONTINENCE OF FECES WITH FECAL URGENCY: ICD-10-CM

## 2024-02-06 DIAGNOSIS — R15.2 INCONTINENCE OF FECES WITH FECAL URGENCY: ICD-10-CM

## 2024-02-06 DIAGNOSIS — N39.46 URINARY INCONTINENCE, MIXED: Primary | ICD-10-CM

## 2024-02-06 PROCEDURE — 97530 THERAPEUTIC ACTIVITIES: CPT | Mod: GP

## 2024-02-06 PROCEDURE — 97112 NEUROMUSCULAR REEDUCATION: CPT | Mod: GP

## 2024-02-06 ASSESSMENT — PAIN SCALES - GENERAL: PAINLEVEL_OUTOF10: 0 - NO PAIN

## 2024-02-06 ASSESSMENT — PAIN - FUNCTIONAL ASSESSMENT: PAIN_FUNCTIONAL_ASSESSMENT: 0-10

## 2024-02-06 NOTE — PROGRESS NOTES
"Physical Therapy Treatment    Patient Name: Jeanette Del Toro  MRN: [id number]  Today's Date: 2/6/2024  Time Calculation  Start Time: 1300  Stop Time: 1400  Time Calculation (min): 60 min  PT Therapeutic Procedures Time Entry  Neuromuscular Re-Education Time Entry: 13  Therapeutic Activity Time Entry: 40  Visit # 10/10    Current Problem   1. Urinary incontinence, mixed        2. Mixed stress and urge urinary incontinence  Follow Up In Physical Therapy      3. Incontinence of feces with fecal urgency  Follow Up In Physical Therapy          Subjective   General    Doing better. \"Progress is being made.\" Continues with fiber gummies most days and is having bowel movement almost every day. Reports fecal staining is much, much better. Feels her staining is impacted by stool consistency. Reports she has had some stress and has some abdominal pain that she associates with this.    Precautions:  Precautions  STEADI Fall Risk Score (The score of 4 or more indicates an increased risk of falling): 0  Pain   Pain Assessment: 0-10  Pain Score: 0 - No pain    Post Treatment Pain Level 0    Objective   Internal assessment of pelvic floor muscles (vaginal) completed in supine position with patient consent.    Observation:  Vuvlar tissue pale pink without notable dryness    Internal:  Superficial layer mm   Non tender    Deep layer:  Non tender, tone WFL    Muscle Excursion  Normal lift, lengthen    Strength  4-/5    Prolapse  None observed     Internal (rectal) assessment of pelvic floor mm in R sidelying position with patient consent:    Observation: mild irritation perianal tissue    Anal wink reflex: intact    External Anal spincter:  Pain none  Tone WFL  Strength good- (able to hold against pressure)  Relaxation complete  Coordination good contract/relax/descent      Treatments:   Therapeutic Activity  Therapeutic Activity Performed: Yes  Therapeutic Activity 1: Review of symptom updates. Discussed mechanism of fecal staining, " which happens without her awareness. Reviewed impact of stool consistency and complete bowel emptying on stool staining.  Therapeutic Activity 2: Educated patient in use of manual splinting for helping with complete bowel emptying as needed  Therapeutic Activity 3: Educated patient in use of 5-10 pfm contractions following bowel emptying to signal end of BM to body  Therapeutic Activity 4: Internal vaginal and rectal assessments completed as noted and edcucated patient in findings and comparison with previous assessment    Balance/Neuromuscular Re-Education  Balance/Neuromuscular Re-Education Activity Performed: Yes  Balance/Neuromuscular Re-Education Activity 1: During internal: educated patient re: quick flick pelvic floor mm contraction with verbal and tactile cueing     Therapeutic exercise  Issued handout for isometric hip press with exhale       Assessment   Assessment:    Intenral assessment reveals improved pelvic floor mm and EAS strength and coordination. Patients symptoms are overall improved. Patient continues to have fecal staining intermittently, but much less. Will benefit from continued exercise progression and manual therapy to abdominal wall. Goal assessment as noted.    Plan:    Extend time frame of goals to visit #14 and continue with exercises and bowel training.    OP EDUCATION:       Goals:   Active       PT LTGs       demos pelvic floor mm strength at least 3/5 with normal/complete relaxation after contract for improved continence of bladder and bowel  (Met)       Start:  09/28/23    Expected End:  02/18/24    Resolved:  02/06/24         demos stool consistency 2-4  for improved efficiency of holding stool  (Met)       Start:  09/28/23    Expected End:  02/18/24    Resolved:  01/11/24         demos I with progressed home exercise program with proper breath coordination for iAP management  (Met)       Start:  09/28/23    Expected End:  02/18/24    Resolved:  01/11/24         report episodes of  UI and FI reduced by 75% or more  (Progressing)       Start:  09/28/23    Expected End:  03/15/24            report nocturia 2x or less (Met)       Start:  09/28/23    Expected End:  02/18/24    Resolved:  01/11/24             Patient will report abdominal wall pain improved by 75% or more most of the time       Start:  02/06/24    Expected End:  03/15/24                  Resolved       PT STGs       demos I with exercises issued thus far with proper breath coordination and no UI  (Met)       Start:  09/28/23    Expected End:  10/31/23    Resolved:  11/09/23         consistently demos pelvic floor mm excursion in coordination with breath cycle  (Met)       Start:  09/28/23    Expected End:  11/30/23    Resolved:  11/20/23         report sucess with urge suppression strategies 50% of the time with bowel/bladder urgency (Met)       Start:  09/28/23    Expected End:  12/15/23    Resolved:  12/21/23

## 2024-02-08 ENCOUNTER — HOSPITAL ENCOUNTER (OUTPATIENT)
Dept: RADIOLOGY | Facility: HOSPITAL | Age: 79
Discharge: HOME | End: 2024-02-08
Payer: MEDICARE

## 2024-02-08 VITALS — BODY MASS INDEX: 18.83 KG/M2 | HEIGHT: 67 IN | WEIGHT: 120 LBS

## 2024-02-08 DIAGNOSIS — Z12.31 ENCOUNTER FOR SCREENING MAMMOGRAM FOR BREAST CANCER: ICD-10-CM

## 2024-02-08 DIAGNOSIS — Z78.0 ASYMPTOMATIC MENOPAUSAL STATE: ICD-10-CM

## 2024-02-08 PROCEDURE — 77067 SCR MAMMO BI INCL CAD: CPT

## 2024-02-08 PROCEDURE — 77085 DXA BONE DENSITY AXL VRT FX: CPT

## 2024-02-09 ENCOUNTER — APPOINTMENT (OUTPATIENT)
Dept: RADIOLOGY | Facility: CLINIC | Age: 79
End: 2024-02-09
Payer: MEDICARE

## 2024-02-22 ENCOUNTER — TREATMENT (OUTPATIENT)
Dept: PHYSICAL THERAPY | Facility: CLINIC | Age: 79
End: 2024-02-22
Payer: MEDICARE

## 2024-02-22 DIAGNOSIS — N39.46 URINARY INCONTINENCE, MIXED: Primary | ICD-10-CM

## 2024-02-22 DIAGNOSIS — R15.2 INCONTINENCE OF FECES WITH FECAL URGENCY: ICD-10-CM

## 2024-02-22 DIAGNOSIS — R15.9 INCONTINENCE OF FECES WITH FECAL URGENCY: ICD-10-CM

## 2024-02-22 DIAGNOSIS — N39.46 MIXED STRESS AND URGE URINARY INCONTINENCE: ICD-10-CM

## 2024-02-22 PROCEDURE — 97110 THERAPEUTIC EXERCISES: CPT | Mod: GP

## 2024-02-22 PROCEDURE — 97530 THERAPEUTIC ACTIVITIES: CPT | Mod: GP

## 2024-02-22 ASSESSMENT — PAIN SCALES - GENERAL: PAINLEVEL_OUTOF10: 0 - NO PAIN

## 2024-02-22 ASSESSMENT — PAIN - FUNCTIONAL ASSESSMENT: PAIN_FUNCTIONAL_ASSESSMENT: 0-10

## 2024-02-22 NOTE — PROGRESS NOTES
"Physical Therapy Treatment    Patient Name: Jeanette Del Toro  MRN: [id number]  Today's Date: 2/22/2024  Time Calculation  Start Time: 1400  Stop Time: 1500  Time Calculation (min): 60 min  PT Therapeutic Procedures Time Entry  Therapeutic Exercise Time Entry: 30  Therapeutic Activity Time Entry: 25    Insurance:  Visit number: 11 of 14  Authorization info: [place]  Insurance Type: [facility] / Plan: MARILY [employer]AYER MEDICARE / Product Type: *No Product type* /     Current Problem   1. Urinary incontinence, mixed        2. Mixed stress and urge urinary incontinence  Follow Up In Physical Therapy      3. Incontinence of feces with fecal urgency  Follow Up In Physical Therapy          Subjective   General    Still feeling positive overall. Not perfect, but FI is much improved. Still depends on consistency of stool. Although did have one episode where she noted a \"pebble\" of stool had come out and she did not even realize. She has tried finishing BM with pelvic floor contractions.  Improved urinary urge suppression, does still not a few drops UI frequently when on her way to bathroom.     Precautions:  Precautions  STEADI Fall Risk Score (The score of 4 or more indicates an increased risk of falling): 0  Pain   Pain Assessment: 0-10  Pain Score: 0 - No pain  Post Treatment Pain Level 0    Objective   Min cues for alignment with standing and 1/2 kneeling exercises.    Treatments:  Therapeutic Exercise:  Therapeutic Exercise  Therapeutic Exercise Performed: Yes  Therapeutic Exercise Activity 1: Seated on 65 cm ball: pricilla breath x5, pfm isolation x5  Therapeutic Exercise Activity 2: Seated on 65cm ball: unilat rotation (green) x10 each, scap retract (green) x10  Therapeutic Exercise Activity 3: Sit to stand with 5# froem 65cm ball  Therapeutic Exercise Activity 4: Half kneel pfm isolation with exhale (focus on anterior/ posterior pelvic floor)  Therapeutic Exercise Activity 5: Pallof press (green) x10 bilat  Therapeutic " Exercise Activity 6: Stagger stance in standing with pfm isolation x10 ea way  Therapeutic Exercise Activity 7: 1/2 kneel wood chop without resistance  Therapeutic activity:  Therapeutic Activity  Therapeutic Activity Performed: Yes  Therapeutic Activity 1: Review of symptoms. Reviewe of manual splinting and post evacuation pfm contractions.  Therapeutic Activity 2: Educated patient re: potential benefits of topical estrogen around urethra for improved closure.       Assessment   Assessment:    Overall, patient continues to make progress. She demos very good exercise technique and verbalizes good understanding of movement strategies.    Plan:    Continue to progress exercises and pfm coordination.    OP EDUCATION:   See above.    Goals:   Active       PT LTGs       demos pelvic floor mm strength at least 3/5 with normal/complete relaxation after contract for improved continence of bladder and bowel  (Met)       Start:  09/28/23    Expected End:  02/18/24    Resolved:  02/06/24         demos stool consistency 2-4  for improved efficiency of holding stool  (Met)       Start:  09/28/23    Expected End:  02/18/24    Resolved:  01/11/24         demos I with progressed home exercise program with proper breath coordination for iAP management  (Met)       Start:  09/28/23    Expected End:  02/18/24    Resolved:  01/11/24         report episodes of UI and FI reduced by 75% or more  (Progressing)       Start:  09/28/23    Expected End:  03/15/24            report nocturia 2x or less (Met)       Start:  09/28/23    Expected End:  02/18/24    Resolved:  01/11/24             Patient will report abdominal wall pain improved by 75% or more most of the time       Start:  02/06/24    Expected End:  03/15/24                  Resolved       PT STGs       demos I with exercises issued thus far with proper breath coordination and no UI  (Met)       Start:  09/28/23    Expected End:  10/31/23    Resolved:  11/09/23         consistently  bart pelvic floor mm excursion in coordination with breath cycle  (Met)       Start:  09/28/23    Expected End:  11/30/23    Resolved:  11/20/23         report sucess with urge suppression strategies 50% of the time with bowel/bladder urgency (Met)       Start:  09/28/23    Expected End:  12/15/23    Resolved:  12/21/23

## 2024-02-28 ENCOUNTER — TREATMENT (OUTPATIENT)
Dept: PHYSICAL THERAPY | Facility: CLINIC | Age: 79
End: 2024-02-28
Payer: MEDICARE

## 2024-02-28 DIAGNOSIS — R15.2 INCONTINENCE OF FECES WITH FECAL URGENCY: ICD-10-CM

## 2024-02-28 DIAGNOSIS — N39.46 URINARY INCONTINENCE, MIXED: Primary | ICD-10-CM

## 2024-02-28 DIAGNOSIS — R15.9 INCONTINENCE OF FECES WITH FECAL URGENCY: ICD-10-CM

## 2024-02-28 DIAGNOSIS — N39.46 MIXED STRESS AND URGE URINARY INCONTINENCE: ICD-10-CM

## 2024-02-28 PROCEDURE — 97530 THERAPEUTIC ACTIVITIES: CPT | Mod: GP

## 2024-02-28 PROCEDURE — 97110 THERAPEUTIC EXERCISES: CPT | Mod: GP

## 2024-02-28 ASSESSMENT — PAIN SCALES - GENERAL: PAINLEVEL_OUTOF10: 0 - NO PAIN

## 2024-02-28 ASSESSMENT — PAIN - FUNCTIONAL ASSESSMENT: PAIN_FUNCTIONAL_ASSESSMENT: 0-10

## 2024-02-28 NOTE — PROGRESS NOTES
"Physical Therapy Treatment    Patient Name: Jeanette Del Toro  MRN: [id number]  Today's Date: 2/28/2024  Time Calculation  Start Time: 1530  Stop Time: 1630  Time Calculation (min): 60 min    Insurance:  Visit number: 12 of 14  [place] info[place]  Ins[place] Type: Payor: [employer]IC[employer] / Plan: [employer] / Product Type: *No Product type* /     Current Problem   1. Urinary incontinence, mixed        2. Mixed stress and urge urinary incontinence  Follow Up In Physical Therapy      3. Incontinence of feces with fecal urgency  Follow Up In Physical Therapy          Subjective   General   \"Bowels are good.\"   Decided to reduce fiber gummies from 2 every day to 2 every other day. Felt that stool was getting too soft. Nocturia x2 most nights, improved from 3x. Still having successes with urge suppression.  Continues to lose a few drops of urine in the moments before she gets to toilet.  Precautions:  Precautions  STEADI Fall Risk Score (The score of 4 or more indicates an increased risk of falling): 0  Pain   Pain Assessment: 0-10  Pain Score: 0 - No pain  Post Treatment Pain Level 0    Objective   Visible TA activation in standing with cues for pfm coordination, no accessory glute  Treatments:  Therapeutic Exercise:  Therapeutic Exercise  Therapeutic Exercise Performed: Yes  Therapeutic Exercise Activity 1: 2\" airex foam: pfm isolation x10 ant. x10 post  Therapeutic Exercise Activity 2: 2\" air ex foam: balll\ toss (1kg ball, varying speed/force)  Therapeutic Exercise Activity 3: 2\" airex foam: slow MIP with unilateral 5# weight  Therapeutic Exercise Activity 4: Half kneel pfm isolation with exhale (focus on anterior/ posterior pelvic floor)  Therapeutic Exercise Activity 5: Pallof press (green) x10 bilat  Therapeutic Exercise Activity 6: Stagger stance in standing with pfm isolation x10 ea way  Therapeutic Exercise Activity 7: Review of 1/2 kneel exercises and purpose    Therapeutic activity:  Therapeutic " Activity  Therapeutic Activity Performed: Yes  Therapeutic Activity 1: Reviewed potential causes for bladder leaks intermittently. Discussed possible influence of dietary irritants, stress, habits (bathroom location.)  Therapeutic Activity 2: Encouraged patient to use distraction/delay techniques to disrupt her current patterns (ie, brush hair before sitting down to use commode, take different path to bathroom, use different bathroom)  Therapeutic Activity 3: Educated patient in association between balance and pelvic floor (role of pelvic floor mm as part of core, balance strategies as she may be rushing to bathroom)       Assessment   Assessment:    Patient continues to have intermittent UUI (small amount) as she approaches the toilet. Patient is very receptive to continued exercise and behavioral changes. Patient would like to pursue these options prior to consideration of topical estrogen.     Plan:    Continue with standing exercises and neuro re-ed, as well as behavioral modification.    OP EDUCATION:   Patient to monitor potential patterns associated with UI.    Goals:   Active       PT LTGs       demos pelvic floor mm strength at least 3/5 with normal/complete relaxation after contract for improved continence of bladder and bowel  (Met)       Start:  09/28/23    Expected End:  02/18/24    Resolved:  02/06/24         demos stool consistency 2-4  for improved efficiency of holding stool  (Met)       Start:  09/28/23    Expected End:  02/18/24    Resolved:  01/11/24         demos I with progressed home exercise program with proper breath coordination for iAP management  (Met)       Start:  09/28/23    Expected End:  02/18/24    Resolved:  01/11/24         report episodes of UI and FI reduced by 75% or more  (Progressing)       Start:  09/28/23    Expected End:  03/15/24            report nocturia 2x or less (Met)       Start:  09/28/23    Expected End:  02/18/24    Resolved:  01/11/24             Patient will  report abdominal wall pain improved by 75% or more most of the time       Start:  02/06/24    Expected End:  03/15/24                  Resolved       PT STGs       demos I with exercises issued thus far with proper breath coordination and no UI  (Met)       Start:  09/28/23    Expected End:  10/31/23    Resolved:  11/09/23         consistently demos pelvic floor mm excursion in coordination with breath cycle  (Met)       Start:  09/28/23    Expected End:  11/30/23    Resolved:  11/20/23         report sucess with urge suppression strategies 50% of the time with bowel/bladder urgency (Met)       Start:  09/28/23    Expected End:  12/15/23    Resolved:  12/21/23

## 2024-03-06 ENCOUNTER — TREATMENT (OUTPATIENT)
Dept: PHYSICAL THERAPY | Facility: CLINIC | Age: 79
End: 2024-03-06
Payer: MEDICARE

## 2024-03-06 DIAGNOSIS — R15.9 INCONTINENCE OF FECES WITH FECAL URGENCY: ICD-10-CM

## 2024-03-06 DIAGNOSIS — N39.46 URINARY INCONTINENCE, MIXED: Primary | ICD-10-CM

## 2024-03-06 DIAGNOSIS — N39.46 MIXED STRESS AND URGE URINARY INCONTINENCE: ICD-10-CM

## 2024-03-06 DIAGNOSIS — R15.2 INCONTINENCE OF FECES WITH FECAL URGENCY: ICD-10-CM

## 2024-03-06 PROCEDURE — 97110 THERAPEUTIC EXERCISES: CPT | Mod: GP

## 2024-03-06 PROCEDURE — 97530 THERAPEUTIC ACTIVITIES: CPT | Mod: GP

## 2024-03-06 ASSESSMENT — PAIN - FUNCTIONAL ASSESSMENT: PAIN_FUNCTIONAL_ASSESSMENT: 0-10

## 2024-03-06 ASSESSMENT — PAIN SCALES - GENERAL: PAINLEVEL_OUTOF10: 0 - NO PAIN

## 2024-03-06 NOTE — PROGRESS NOTES
"Physical Therapy Treatment    Patient Name: Jeanette Del Toro  MRN: [id number]  Today's Date: 3/6/2024  Time Calculation  Start Time: 1530  Stop Time: 1630  Time Calculation (min): 60 min  PT Therapeutic Procedures Time Entry  Therapeutic Exercise Time Entry: 38  Therapeutic Activity Time Entry: 15    Insurance:  Visit number: 13 of 14  [place] info: [place]  Ins[facility]: [facility] / Plan: [employer] MEDICARE / Product Type: *No Product type* /     Current Problem   1. Urinary incontinence, mixed        2. Mixed stress and urge urinary incontinence  Follow Up In Physical Therapy      3. Incontinence of feces with fecal urgency  Follow Up In Physical Therapy          Subjective   General    She has been consistent with exercise. She is still playing with timing of fiber gummies. Decided on every other day. Nocturia x2 continues. (2-2.5 hours first time, 4 hours the next time.)   Last Thurs and Fri no drips/ UUI on way to toilet. Did have a couple of episodes 1-2x on the next few days (getting in shower, pulling in driveway) -- sudden onset with small amount.    Precautions:  Precautions  STEADI Fall Risk Score (The score of 4 or more indicates an increased risk of falling): 0  Pain   Pain Assessment: 0-10  Pain Score: 0 - No pain  Post Treatment Pain Level 0    Objective   Patient demos excellent exercise technique  No DARCIE during exercise  Excellent breath coordination for IAP management with exercises    Treatments:  Therapeutic Exercise:  Therapeutic Exercise  Therapeutic Exercise Performed: Yes  Therapeutic Exercise Activity 1: 2\" airex foam: unilat rotation with green band self anchor  Therapeutic Exercise Activity 2: 2\" ariex foam: trunk rotation with 8# weight at chest  Therapeutic Exercise Activity 3: 2\" airex foam: slow MIP with unilateral 5# weight  Therapeutic Exercise Activity 4: Half kneel pfm isolation with exhale (focus on anterior/ posterior pelvic floor)  Therapeutic Exercise " "Activity 5: Pallof press (green) x10 bilat  Therapeutic Exercise Activity 6: 6\" step up and over, 6\" forward step up (Both performed  with  and without pfm coordination, with 5=8# weight in one hand and at chest to simulate different ways she may ascend stairs)  Therapeutic Exercise Activity 7: 1/2 kneel woodchoop with green band (PT anchored)  Therapeutic activity:  Therapeutic Activity  Therapeutic Activity Performed: Yes  Therapeutic Activity 1: Reviewed patient notes regarding incidences of UUI and whether she was able to identify particular triggers.  Therapeutic Activity 2: Reviewed use of distraction/ delay techniques when entering bathroom/getting close to toilet in order to re-set neural pathways that may be contributing to urgency/ UUI    Assessment   Assessment:    Goals are nearly achieved. FI is mostly abolished; patient is managing stool consistency well with fiber balance and this is very helpful. Patient is compliant with HEP and very active around the house and with strengthening exercises she has from ortho PT for plantar fasciitis. She demos good awareness of movement strategies managing IAP.     Plan:    Re- assess in 3-4 weeks and plan for discharge unless symptoms have changed.    OP EDUCATION:   Continue with HEP and fiber management.    Goals:   Active       PT LTGs       demos pelvic floor mm strength at least 3/5 with normal/complete relaxation after contract for improved continence of bladder and bowel  (Met)       Start:  09/28/23    Expected End:  02/18/24    Resolved:  02/06/24         demos stool consistency 2-4  for improved efficiency of holding stool  (Met)       Start:  09/28/23    Expected End:  02/18/24    Resolved:  01/11/24         demos I with progressed home exercise program with proper breath coordination for iAP management  (Met)       Start:  09/28/23    Expected End:  02/18/24    Resolved:  01/11/24         report episodes of UI and FI reduced by 75% or more  " (Progressing)       Start:  09/28/23    Expected End:  03/15/24            report nocturia 2x or less (Met)       Start:  09/28/23    Expected End:  02/18/24    Resolved:  01/11/24             Patient will report abdominal wall pain improved by 75% or more most of the time       Start:  02/06/24    Expected End:  03/15/24                  Resolved       PT STGs       demos I with exercises issued thus far with proper breath coordination and no UI  (Met)       Start:  09/28/23    Expected End:  10/31/23    Resolved:  11/09/23         consistently demos pelvic floor mm excursion in coordination with breath cycle  (Met)       Start:  09/28/23    Expected End:  11/30/23    Resolved:  11/20/23         report sucess with urge suppression strategies 50% of the time with bowel/bladder urgency (Met)       Start:  09/28/23    Expected End:  12/15/23    Resolved:  12/21/23

## 2024-03-11 ENCOUNTER — APPOINTMENT (OUTPATIENT)
Dept: PHYSICAL THERAPY | Facility: CLINIC | Age: 79
End: 2024-03-11
Payer: MEDICARE

## 2024-03-21 DIAGNOSIS — Z12.11 COLON CANCER SCREENING: ICD-10-CM

## 2024-03-21 RX ORDER — SODIUM PICOSULFATE, MAGNESIUM OXIDE, AND ANHYDROUS CITRIC ACID 12; 3.5; 1 G/175ML; G/175ML; MG/175ML
1 LIQUID ORAL SEE ADMIN INSTRUCTIONS
Qty: 175 ML | Refills: 0 | Status: SHIPPED | OUTPATIENT
Start: 2024-03-21

## 2024-04-02 ENCOUNTER — TREATMENT (OUTPATIENT)
Dept: PHYSICAL THERAPY | Facility: CLINIC | Age: 79
End: 2024-04-02
Payer: MEDICARE

## 2024-04-02 DIAGNOSIS — R15.2 INCONTINENCE OF FECES WITH FECAL URGENCY: ICD-10-CM

## 2024-04-02 DIAGNOSIS — R15.9 INCONTINENCE OF FECES WITH FECAL URGENCY: ICD-10-CM

## 2024-04-02 DIAGNOSIS — N39.46 MIXED STRESS AND URGE URINARY INCONTINENCE: ICD-10-CM

## 2024-04-02 DIAGNOSIS — N39.46 URINARY INCONTINENCE, MIXED: ICD-10-CM

## 2024-04-02 PROCEDURE — 97530 THERAPEUTIC ACTIVITIES: CPT | Mod: GP

## 2024-04-02 PROCEDURE — 97110 THERAPEUTIC EXERCISES: CPT | Mod: GP

## 2024-04-02 ASSESSMENT — PAIN SCALES - GENERAL: PAINLEVEL_OUTOF10: 0 - NO PAIN

## 2024-04-02 ASSESSMENT — PAIN - FUNCTIONAL ASSESSMENT: PAIN_FUNCTIONAL_ASSESSMENT: 0-10

## 2024-04-02 NOTE — PROGRESS NOTES
Physical Therapy Treatment    Patient Name: Jeanette Del Toro  MRN: [id number]  Today's Date: 4/2/2024  Time Calculation  Start Time: 1305  Stop Time: 1400  Time Calculation (min): 55 min  PT Therapeutic Procedures Time Entry  Therapeutic Exercise Time Entry: 15  Therapeutic Activity Time Entry: 30    Insurance:  Visit number: 14 of 14  [place] info[place]  Ins[place] Type: Payor: [employer] / Plan: [employer] MEDICARE / Product Type: *No Product type* /     Current Problem   1. Mixed stress and urge urinary incontinence  Follow Up In Physical Therapy      2. Incontinence of feces with fecal urgency  Follow Up In Physical Therapy      3. Urinary incontinence, mixed  Follow Up In Physical Therapy          Subjective   General   Patient reports feeling a little discouraged. Has not been as consistent exercises in past few weeks. She feels they have helped as much as they are going to. She is concerned that there is something more going on with her colon, family history of colorectal CA. She is scheduled for colonoscopy in May. She notes every couple of weeks she has an episode where a small amount of stool  has emerged in the hours after BM. This seems to happen more when stool is on softer side. Fiber supplements help to keep her regular, but stool consistency still varies. Patient feels this is due to IBS.  She is still having some success with urinary urge suppression. Occasionally has a few drops of urine loss with bending over or twisting.  Urinary incontinence improved by 75%, FI not improved.    Precautions:  Precautions  STEADI Fall Risk Score (The score of 4 or more indicates an increased risk of falling): 0  Pain   Pain Assessment: 0-10  Pain Score: 0 - No pain  Post Treatment Pain Level 0    Objective   Continued episodes of FI every few weeks.  75% improved UUI    Treatments:  Therapeutic Exercise:  Therapeutic Exercise  Therapeutic Exercise Performed: Yes  Therapeutic Exercise Activity 1: REVIEW  OF ENTIRE HEP AND DIVIDING INTO THREE GROUPS: STAR PERFORMERS, STRENGTH AND STRETCH/RELAXATION    Therapeutic activity:  Therapeutic Activity  Therapeutic Activity Performed: Yes  Therapeutic Activity 1: Lengthy review of recent symptoms, goal review and plan of care moving forward. Discussed with patient that intermittent bowel incontinence is not a symptom of colorectal CA, but pursuing that test is warranted due to her family history. Educted patient re: potential benefit of biofeedback/ e-stim, as swell as further colorectal consult and basics of what anal manometry/ emg testing may entail. Reviewed impact of stool consistency on fecal continence and how this is impacted by IBS. Reviewed urge suppression strategies.    Assessment   Assessment:    Patient is very frustrated by continued episodes of post evacuation FI. Patient has reported significant improvement with urinary incontinence symptoms. Given overall good EAS strength and coordination, it is likely that variability in stool consistency is contributing to FI episodes. Patient would likely benefit from further colorectal consultation, possibly with anal manometry and emg studies.    Plan:    Patient to take some time off PT to pursue colonoscopy and colorectal consult. Will follow up on 5/28.    OP EDUCATION:   Continue with exercises and fiber use to manage stool consistency.    Goals:   Active       PT LTGs       demos pelvic floor mm strength at least 3/5 with normal/complete relaxation after contract for improved continence of bladder and bowel  (Met)       Start:  09/28/23    Expected End:  02/18/24    Resolved:  02/06/24         demos stool consistency 2-4  for improved efficiency of holding stool  (Met)       Start:  09/28/23    Expected End:  02/18/24    Resolved:  01/11/24         demos I with progressed home exercise program with proper breath coordination for iAP management  (Met)       Start:  09/28/23    Expected End:  02/18/24    Resolved:   01/11/24         report episodes of UI and FI reduced by 75% or more  (Progressing)       Start:  09/28/23    Expected End:  05/28/24            report nocturia 2x or less (Met)       Start:  09/28/23    Expected End:  02/18/24    Resolved:  01/11/24             Patient will report abdominal wall pain improved by 75% or more most of the time (Met)       Start:  02/06/24    Expected End:  03/15/24    Resolved:  04/02/24               Resolved       PT STGs       demos I with exercises issued thus far with proper breath coordination and no UI  (Met)       Start:  09/28/23    Expected End:  10/31/23    Resolved:  11/09/23         consistently demos pelvic floor mm excursion in coordination with breath cycle  (Met)       Start:  09/28/23    Expected End:  11/30/23    Resolved:  11/20/23         report sucess with urge suppression strategies 50% of the time with bowel/bladder urgency (Met)       Start:  09/28/23    Expected End:  12/15/23    Resolved:  12/21/23

## 2024-04-05 DIAGNOSIS — M81.0 AGE-RELATED OSTEOPOROSIS WITHOUT CURRENT PATHOLOGICAL FRACTURE: ICD-10-CM

## 2024-04-05 RX ORDER — ALENDRONATE SODIUM 70 MG/1
70 TABLET ORAL
Qty: 12 TABLET | Refills: 2 | Status: SHIPPED | OUTPATIENT
Start: 2024-04-05 | End: 2024-04-05 | Stop reason: SDUPTHER

## 2024-04-05 RX ORDER — ALENDRONATE SODIUM 70 MG/1
70 TABLET ORAL
Qty: 12 TABLET | Refills: 2 | Status: SHIPPED | OUTPATIENT
Start: 2024-04-05

## 2024-05-09 ENCOUNTER — OFFICE VISIT (OUTPATIENT)
Dept: GASTROENTEROLOGY | Facility: EXTERNAL LOCATION | Age: 79
End: 2024-05-09
Payer: MEDICARE

## 2024-05-09 DIAGNOSIS — Z80.0 FAMILY HISTORY OF MALIGNANT NEOPLASM OF GASTROINTESTINAL TRACT: Primary | ICD-10-CM

## 2024-05-09 DIAGNOSIS — Z12.11 ENCOUNTER FOR SCREENING FOR MALIGNANT NEOPLASM OF COLON: ICD-10-CM

## 2024-05-09 DIAGNOSIS — K57.30 DIVERTICULOSIS OF LARGE INTESTINE WITHOUT DIVERTICULITIS: ICD-10-CM

## 2024-05-09 PROCEDURE — 1159F MED LIST DOCD IN RCRD: CPT | Performed by: INTERNAL MEDICINE

## 2024-05-09 PROCEDURE — 1157F ADVNC CARE PLAN IN RCRD: CPT | Performed by: INTERNAL MEDICINE

## 2024-05-09 PROCEDURE — 45378 DIAGNOSTIC COLONOSCOPY: CPT | Performed by: INTERNAL MEDICINE

## 2024-05-16 ENCOUNTER — OFFICE VISIT (OUTPATIENT)
Dept: SURGERY | Facility: CLINIC | Age: 79
End: 2024-05-16
Payer: MEDICARE

## 2024-05-16 VITALS
WEIGHT: 124 LBS | HEART RATE: 66 BPM | DIASTOLIC BLOOD PRESSURE: 84 MMHG | SYSTOLIC BLOOD PRESSURE: 146 MMHG | BODY MASS INDEX: 19.42 KG/M2

## 2024-05-16 DIAGNOSIS — R15.9 INCONTINENCE OF FECES WITH FECAL URGENCY: Primary | ICD-10-CM

## 2024-05-16 DIAGNOSIS — R15.2 INCONTINENCE OF FECES WITH FECAL URGENCY: Primary | ICD-10-CM

## 2024-05-16 PROCEDURE — 1157F ADVNC CARE PLAN IN RCRD: CPT | Performed by: SURGERY

## 2024-05-16 PROCEDURE — 99204 OFFICE O/P NEW MOD 45 MIN: CPT | Performed by: SURGERY

## 2024-05-16 PROCEDURE — 1159F MED LIST DOCD IN RCRD: CPT | Performed by: SURGERY

## 2024-05-16 NOTE — PROGRESS NOTES
"HPI    Jeanette Del Toro is a 78 y.o. female with c/o fecal incontinence.  She has completed 14 sessions of pelvic floor physical therapy. The PT has improved her urinary incontinence, however she feels her fecal incontinence is worse. She started taking fiber gummies at the advice of the her physical therapists.     She has incontinence about once per week, but has increased over the past few months.     The stool comes out as a surprise to her.  It does not come with urgency. \"I don't feel it coming\".  She will have normal stools between episodes.   When she has an accident the stool is smears of stool.  She needs to wear a pad daily.  She will have a bowel motion pretty much daily but admits that she can skip a day, however this skip does not lead to a accident following.  The incontinence does not happen after the day she misses a bowel motion.  Stool consistency varies from soft to hard.     She eats a high fiber diet and cannot pin point if food is triggering the incontinence or not. She takes fiber gummies.  Incontinence worsened after taking the fiber gummies, has never tried the powder.  She also has urinary incontinence. Has not met with a urologist about this.     Colonoscopy ([facility], May 2024) Diverticulosis noted in sigmoid colon, otherwise exam was normal.  No history of polyps ever.     Non-smoker/No ETOH/No Illicit drug use  PMH: IBS, COPD (inhalers, no oxygen requirement)  PSH: Right elbow shoulder   Mother and two maternal aunt with colon cancer   Employment:     Past Medical History:   Diagnosis Date    Displaced fracture of lateral malleolus of right fibula, initial encounter for closed fracture 05/13/2019    Closed displaced fracture of lateral malleolus of right fibula, initial encounter    History of osteoporosis     Other fracture of unspecified lower leg, initial encounter for closed fracture 04/11/2019    Avulsion fracture of ankle    Unspecified fracture of shaft of humerus, unspecified " arm, initial encounter for closed fracture     Closed fracture of humerus       Past Surgical History:   Procedure Laterality Date     SECTION, CLASSIC  2013     Section    OTHER SURGICAL HISTORY  2013    Repair Of Humerus / Arm    TONSILLECTOMY  07/15/2014    Tonsillectomy With Adenoidectomy       Allergies   Allergen Reactions    Terbinafine Other     No taste for 3 months states patinet       Physical Exam  Well appearing  Perineum normal  Good tone, slightly diminished. Reasonable squeeze. Normal relaxation and push.     Assessment and Plan:   Fecal smearing - the patient has not identified any triggers in terms of bowel patterns, consistency, or foods that seem to precipitate but during our visit today she shared it was worse since starting fiber gummies. We are going to switch her to fiber powder and start with a half a dose a day to regulate consistency.   I also advised her to continue PFPT and focus on strengthening the pelvic floor muscles.   We briefly discussed other options including SNS, but she would need to see a urogynecologist for more information and to discuss this option as I do not perform this procedure.

## 2024-05-28 ENCOUNTER — APPOINTMENT (OUTPATIENT)
Dept: PHYSICAL THERAPY | Facility: CLINIC | Age: 79
End: 2024-05-28
Payer: MEDICARE

## 2024-06-04 ENCOUNTER — TREATMENT (OUTPATIENT)
Dept: PHYSICAL THERAPY | Facility: CLINIC | Age: 79
End: 2024-06-04
Payer: MEDICARE

## 2024-06-04 DIAGNOSIS — N39.46 URINARY INCONTINENCE, MIXED: Primary | ICD-10-CM

## 2024-06-04 DIAGNOSIS — N39.46 MIXED STRESS AND URGE URINARY INCONTINENCE: ICD-10-CM

## 2024-06-04 DIAGNOSIS — R15.9 INCONTINENCE OF FECES WITH FECAL URGENCY: ICD-10-CM

## 2024-06-04 DIAGNOSIS — R15.2 INCONTINENCE OF FECES WITH FECAL URGENCY: ICD-10-CM

## 2024-06-04 PROCEDURE — 97112 NEUROMUSCULAR REEDUCATION: CPT | Mod: GP

## 2024-06-04 PROCEDURE — 97110 THERAPEUTIC EXERCISES: CPT | Mod: GP

## 2024-06-04 PROCEDURE — 97530 THERAPEUTIC ACTIVITIES: CPT | Mod: GP

## 2024-06-04 ASSESSMENT — PAIN - FUNCTIONAL ASSESSMENT: PAIN_FUNCTIONAL_ASSESSMENT: 0-10

## 2024-06-04 ASSESSMENT — PAIN SCALES - GENERAL: PAINLEVEL_OUTOF10: 0 - NO PAIN

## 2024-06-04 NOTE — PROGRESS NOTES
Physical Therapy Treatment & Progress Note    Patient Name: Jeanette Del Toro  MRN: [id number]  Today's Date: 6/4/2024  Time Calculation  Start Time: 1305  Stop Time: 1400  Time Calculation (min): 55 min  PT Therapeutic Procedures Time Entry  Neuromuscular Re-Education Time Entry: 15  Therapeutic Exercise Time Entry: 5  Therapeutic Activity Time Entry: 35    Insurance:  Visit number: 15 of 15  Authorization info: [place] visits  [place]: [facility] / Plan[facility] / Product Type: *No Product type* /     Current Problem   1. Urinary incontinence, mixed        2. Mixed stress and urge urinary incontinence  Follow Up In Physical Therapy      3. Incontinence of feces with fecal urgency  Follow Up In Physical Therapy          Subjective   General    Saw Dr. Lane and was not really pleased. She did advise that patient switch to psyllium husk powder vs fiber gummies, which were recommended by her PCP. Also mentioned possible sacral nerve stimulation. Patient feels the psyllium husk 1x/day is helping. She feels more in control of her body than she has in a long time. She has not been losing stool. She has been doing exercises, but not as much.  Urgency of bladder is gone, but still loses a few drops before she gets to the bathroom often.    Precautions:  Precautions  STEADI Fall Risk Score (The score of 4 or more indicates an increased risk of falling): 0  Pain   Pain Assessment: 0-10  Pain Score: 0 - No pain  Post Treatment Pain Level 0    Objective   Internal (rectal) assessment of pelvic floor mm in L sidelying position:    Observation: mild erythema perianal tissue    Anal wink reflex: intact    External Anal spincter:  Pain: none  Tone: very mild hypotonic anal sphincter  Strength: good- (8s endurance)  Relaxation slight delay  Coordination: good  Coccygeus: non tender  Pubococcygeus strength = good     Treatments:  Therapeutic Exercise:   Verbal review of continuing HEP as  established.    Therapeutic activity:  Therapeutic Activity  Therapeutic Activity Performed: Yes  Therapeutic Activity 1: Lengthy symptom review and discussion of patients dietary and fiber changes since here last. Discussed at length the impact of pelvic floor mm strength on bowel continence, as well as impact of stool consistency.  Therapeutic Activity 2: Internal pelvic floor mm assessment (rectal) completed as noted above and educated patient in findings.  Therapeutic Activity 3: Educated patient on potential benefit of sEMG biofeedback and/or NMES on EAS and PFM, as this is something she has not yet tried.    Neuro Re-ed:   Balance/Neuromuscular Re-Education  Balance/Neuromuscular Re-Education Activity Performed: Yes  Balance/Neuromuscular Re-Education Activity 1: During Internal: repeated verbal and tactile cueing for EAS/ PFM isolation with breath coordination and without accessory glute or pelvic tilt for assist. Very gentle stretch pressure to EAS/PC for improved isolation/activation.    Assessment   Assessment:    Re-check of pfm rectally reveals overall good strength with mild hypotonicity. Patient has changed her fiber intake and recently has found she has very good bowel control. These two things combined indicate that patient's PFM strength is currently adequate for bowel continence. Patient may benefit from 2-4 more sessions of PFPT with every 2-4 week frequency to update HEP and address remaining UI.    Plan:    Extend time frame of goals to visit #20     OP EDUCATION:   See above    Goals:   Active       PT LTGs       demos pelvic floor mm strength at least 3/5 with normal/complete relaxation after contract for improved continence of bladder and bowel  (Met)       Start:  09/28/23    Expected End:  02/18/24    Resolved:  02/06/24         demos stool consistency 2-4  for improved efficiency of holding stool  (Met)       Start:  09/28/23    Expected End:  02/18/24    Resolved:  01/11/24         shaneos  I with progressed home exercise program with proper breath coordination for iAP management  (Met)       Start:  09/28/23    Expected End:  02/18/24    Resolved:  01/11/24         report episodes of UI and FI reduced by 75% or more  (Progressing)       Start:  09/28/23    Expected End:  09/02/24            report nocturia 2x or less (Met)       Start:  09/28/23    Expected End:  02/18/24    Resolved:  01/11/24             Patient will report abdominal wall pain improved by 75% or more most of the time (Met)       Start:  02/06/24    Expected End:  03/15/24    Resolved:  04/02/24             Patient will demos good EAS strength with endurance of 10s or more as indication of improved FI       Start:  06/04/24    Expected End:  09/02/24                  Resolved       PT STGs       demos I with exercises issued thus far with proper breath coordination and no UI  (Met)       Start:  09/28/23    Expected End:  10/31/23    Resolved:  11/09/23         consistently demos pelvic floor mm excursion in coordination with breath cycle  (Met)       Start:  09/28/23    Expected End:  11/30/23    Resolved:  11/20/23         report sucess with urge suppression strategies 50% of the time with bowel/bladder urgency (Met)       Start:  09/28/23    Expected End:  12/15/23    Resolved:  12/21/23

## 2024-06-19 DIAGNOSIS — M81.0 AGE-RELATED OSTEOPOROSIS WITHOUT CURRENT PATHOLOGICAL FRACTURE: ICD-10-CM

## 2024-06-19 RX ORDER — ALENDRONATE SODIUM 70 MG/1
70 TABLET ORAL
Qty: 12 TABLET | Refills: 1 | Status: SHIPPED | OUTPATIENT
Start: 2024-06-19

## 2024-06-20 ENCOUNTER — APPOINTMENT (OUTPATIENT)
Dept: DERMATOLOGY | Facility: CLINIC | Age: 79
End: 2024-06-20
Payer: MEDICARE

## 2024-06-20 DIAGNOSIS — L57.0 ACTINIC KERATOSIS: ICD-10-CM

## 2024-06-20 DIAGNOSIS — D22.5 MELANOCYTIC NEVUS OF TRUNK: ICD-10-CM

## 2024-06-20 DIAGNOSIS — D48.5 NEOPLASM OF UNCERTAIN BEHAVIOR OF SKIN: Primary | ICD-10-CM

## 2024-06-20 DIAGNOSIS — L71.9 ROSACEA: ICD-10-CM

## 2024-06-20 DIAGNOSIS — L81.4 LENTIGO: ICD-10-CM

## 2024-06-20 DIAGNOSIS — Z85.828 HISTORY OF NONMELANOMA SKIN CANCER: ICD-10-CM

## 2024-06-20 DIAGNOSIS — L82.1 SEBORRHEIC KERATOSIS: ICD-10-CM

## 2024-06-20 DIAGNOSIS — L57.8 DIFFUSE PHOTODAMAGE OF SKIN: ICD-10-CM

## 2024-06-20 PROCEDURE — 11302 SHAVE SKIN LESION 1.1-2.0 CM: CPT | Performed by: DERMATOLOGY

## 2024-06-20 PROCEDURE — 17000 DESTRUCT PREMALG LESION: CPT | Performed by: DERMATOLOGY

## 2024-06-20 PROCEDURE — 1157F ADVNC CARE PLAN IN RCRD: CPT | Performed by: DERMATOLOGY

## 2024-06-20 PROCEDURE — 99214 OFFICE O/P EST MOD 30 MIN: CPT | Performed by: DERMATOLOGY

## 2024-06-20 PROCEDURE — 1159F MED LIST DOCD IN RCRD: CPT | Performed by: DERMATOLOGY

## 2024-06-20 PROCEDURE — 17003 DESTRUCT PREMALG LES 2-14: CPT | Performed by: DERMATOLOGY

## 2024-06-20 RX ORDER — METRONIDAZOLE 7.5 MG/G
CREAM TOPICAL
Qty: 45 G | Refills: 11 | Status: SHIPPED | OUTPATIENT
Start: 2024-06-20

## 2024-06-20 ASSESSMENT — DERMATOLOGY PATIENT ASSESSMENT
ARE YOU TRYING TO GET PREGNANT: NO
DO YOU HAVE ANY NEW OR CHANGING LESIONS: NO
ARE YOU ON BIRTH CONTROL: NO
DO YOU USE SUNSCREEN: DAILY
DO YOU HAVE IRREGULAR MENSTRUAL CYCLES: NO
ARE YOU AN ORGAN TRANSPLANT RECIPIENT: NO
DO YOU USE A TANNING BED: NO

## 2024-06-20 ASSESSMENT — DERMATOLOGY QUALITY OF LIFE (QOL) ASSESSMENT: ARE THERE EXCLUSIONS OR EXCEPTIONS FOR THE QUALITY OF LIFE ASSESSMENT: NO

## 2024-06-20 ASSESSMENT — ITCH NUMERIC RATING SCALE: HOW SEVERE IS YOUR ITCHING?: 0

## 2024-06-20 NOTE — PROGRESS NOTES
Subjective     Jeanette Del Toro is a 78 y.o. female who presents for the following: Skin Check.  She notes occasional pimple breakouts on her face, especially her nose.  She denies any new, changing, or concerning skin lesions since her last visit; no bleeding, itching, or burning lesions.      Review of Systems:  No other skin or systemic complaints other than what is documented elsewhere in the note.    The following portions of the chart were reviewed this encounter and updated as appropriate:       Skin Cancer History  No skin cancer on file.    Specialty Problems          Dermatology Problems    History of skin cancer    Rosacea, unspecified    Seborrheic keratoses       Past Dermatologic / Past Relevant Medical History:    - history of BCC on right temporal hairline diagnosed on 5/17/21 s/p Mohs surgery by Dr. Madden on 7/27/21  - BCC on left temporal hairline diagnosed at initial visit on 2/1/21 s/p Mohs surgery by Dr. 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 Johnson on 11/18/22  - compound melanocytic neoplasm in actinically damaged skin on right lateral lower cheek diagnosed on 1/9/23 s/p re-excision with 5-mm margins by Dr. Johnson on 2/17/23  - mildly dysplastic junctional nevus on right posterior lateral proximal leg diagnosed on 7/17/23 s/p re-shave on 1/18/24  - no history of melanoma     Family History:    Parents and siblings - nonmelanoma skin cancers  No family history of melanoma    Social History:    The patient is retired from working as an  in [employer] and then at [employer]; she was seen with her  in the office again today    Allergies:   Terbinafine    Current Medications / CAM's:    Current Outpatient Medications:     alendronate (Fosamax) 70 mg tablet, Take 1 tablet (70 mg) by mouth every 7 days. Take in the morning with a full glass of water, on an empty stomach, and do not take anything else by mouth or lie down for the next 30 min., Disp: 12 tablet, Rfl: 1    biotin 10 mg tablet, Take by mouth., Disp: , Rfl:     calcium-D3-mag oxide-C-K2-min 333 mg-8.3 mcg-116.7 mg capsule, Take 1 capsule by mouth 2 times a day., Disp: , Rfl:     cetirizine (ZyrTEC) 10 mg capsule, Take by mouth., Disp: , Rfl:     cycloSPORINE (Restasis) 0.05 % ophthalmic emulsion, , Disp: , Rfl:     DOCOSAHEXAENOIC ACID ORAL, Take by mouth once daily., Disp: , Rfl:     metroNIDAZOLE (Metrocream) 0.75 % cream, Apply twice daily to affected areas of face, Disp: 45 g, Rfl: 11    sod picosulf-mag ox-citric ac (Clenpiq) 10 mg-3.5 gram- 12 gram/175 mL solution, Take 1 Box by mouth see administration instructions. Starting at 6pm night before procedure drink 1 bottle as per instructions, then 5 hours before procedure time drink 2nd as instructed, Disp: 175 mL, Rfl: 0     Objective   Well appearing patient in no apparent distress; mood and affect are within normal limits.    A full examination was performed including scalp, face, eyes, ears, nose, lips, neck, chest, axillae, abdomen, back, bilateral upper extremities, and bilateral lower extremities. All findings within normal limits unless otherwise noted below.    Assessment/Plan   1. Neoplasm of uncertain behavior of skin  Right Lateral Upper Back  8 mm dark brown pigmented, asymmetric papule with an asymmetric pigment network and irregular borders           Shave removal    Lesion length (cm):  0.8  Margin per side (cm):  0.2  Lesion diameter (cm):  1.2  Informed consent: discussed and consent obtained    Timeout: patient name, date of birth, surgical site, and procedure verified    Procedure prep:  Patient was prepped and  draped  Anesthesia: the lesion was anesthetized in a standard fashion    Anesthetic:  1% lidocaine w/ epinephrine 1-100,000 local infiltration  Instrument used: flexible razor blade    Hemostasis achieved with: aluminum chloride    Outcome: patient tolerated procedure well    Post-procedure details: sterile dressing applied and wound care instructions given    Dressing type: bandage and petrolatum      Staff Communication: Dermatology Local Anesthesia: 1 % Lidocaine / Epinephrine - Amount:0.5ml    Specimen 1 - Dermatopathology- DERM LAB  Differential Diagnosis: SK v nevus  Check Margins Yes/No?:    Comments:    Dermpath Lab: Routine Histopathology (formalin-fixed tissue)    2. Actinic keratosis (6)  Head - Anterior (Face) (6)  On her left medial lower forehead and scattered on her bilateral cheeks, there are 6 erythematous, gritty, scaly macules     Actinic Keratoses -left medial lower forehead and scattered on bilateral cheeks.  The pre-cancerous nature of these lesions and treatment options were discussed with the patient today.  At this time, I recommend treatment with liquid nitrogen cryotherapy.  The patient expressed understanding, is in agreement with this plan, and wishes to proceed with cryotherapy today.    Destr of lesion - Head - Anterior (Face)  Complexity: simple    Destruction method: cryotherapy    Informed consent: discussed and consent obtained    Lesion destroyed using liquid nitrogen: Yes    Cryotherapy cycles:  1  Outcome: patient tolerated procedure well with no complications    Post-procedure details: wound care instructions given      3. Melanocytic nevus of trunk  Scattered on the patient's face, neck, trunk, and extremities, there are multiple small, round- to oval-shaped, brown-pigmented and pink-colored, symmetric, uniform-appearing macules and dome-shaped papules    Clinically benign- to slightly atypical-appearing nevi - the clinically benign- to slightly atypical-appearing nature of  the remainder of the patient's nevi was discussed with the patient today.  None of the patient's nevi, with the exception of the one noted above, meet threshold for biopsy today.  I emphasized the importance of performing monthly self-skin exams using the ABCDs of monitoring moles, which were reviewed with the patient today and an informational hand-out provided.  I also emphasized the importance of sun avoidance and sun protection with daily sunscreen use.    4. Seborrheic keratosis  Scattered on the patient's face, neck, trunk, and extremities, there are multiple tan- to light brown-colored, hyperkeratotic, stuck-on appearing papules of varying size and shape    Seborrheic Keratoses - the benign nature of these lesions was discussed with the patient today and reassurance provided.  No treatment is medically indicated for these lesions at this time.    5. Lentigo  Photodistributed  Multiple tan- to light brown-colored, round- to oval-shaped, symmetric and uniform-appearing macules and small patches consistent with lentigines scattered in sun-exposed areas.    Solar Lentigines and photodamage.  The clinically benign-appearing nature of these lesions and their relation to chronic sun exposure were discussed with the patient today and reassurance provided.  None of these lesions meet threshold for biopsy today, and thus no treatment is medically indicated for these lesions at this time.  The signs and symptoms of skin cancer were reviewed and the patient was advised to practice sun protection and sun avoidance, use daily sunscreen, and perform regular self skin exams.  The patient was instructed to monitor these lesions for any changes, such as in size, shape, or color, or associated symptoms and to call our office to schedule a return visit for re-evaluation if any such changes or symptoms are noticed in the future.  The patient expressed understanding and is in agreement with this plan.    6. Rosacea  Head -  Anterior (Face)  On the patient's face, most prominent over the bilateral medial cheeks and nose, there is moderate underlying erythema with several overlying telangiectasias and a few scattered erythematous, inflammatory papules     Rosacea -flare on face; papulo-pustular type.  The chronic and intermittently flaring nature of this condition and treatment options were discussed extensively with the patient today.  At this time, I recommend topical therapy with MetroCream 0.75%, which the patient was instructed to apply twice daily to the affected areas of the face.  I also discussed the various triggers of rosacea with the patient today, especially sun exposure, and emphasized the importance of trigger avoidance, particularly sun avoidance and sun protection with daily sunscreen use.  The risks, benefits, and side effects of this medication were discussed.  The patient expressed understanding and is in agreement with this plan.    Related Medications  metroNIDAZOLE (Metrocream) 0.75 % cream  Apply twice daily to affected areas of face    7. History of nonmelanoma skin cancer  On the patient's right temporal hairline, left temporal hairline, right jawline, right lateral lower cheek, and right posterior lateral proximal leg, there are well-healed scars with no evidence of recurrent growth on exam today.    History of basal cell carcinomas, dysplastic nevi, and actinic keratoses and photodamage.  There is no evidence of recurrence on exam today.  The signs and symptoms of skin cancer were reviewed and the patient was advised to practice sun protection and sun avoidance, use daily sunscreen, and perform regular self skin exams.  I will have the patient return to our office in 6 to 12 months, pending the above biopsy result, for routine follow-up and skin exam, and the patient was instructed to call our office should the patient notice any new, changing, symptomatic, or otherwise concerning skin lesions before then.   The patient expressed understanding and is in agreement with this plan.    8. Diffuse photodamage of skin  Photodistributed  Diffuse photodamage with actinic changes with telangiectasia and mottled pigmentation in sun-exposed areas.    Photodamage.  The signs and symptoms of skin cancer were reviewed and the patient was advised to practice sun protection and sun avoidance, use daily sunscreen, and perform regular self skin exams.  Sun protection was discussed, including avoiding the mid-day sun, wearing a sunscreen with SPF at least 50, and stressing the need for reapplication of sunscreen and applying more than they think they need.

## 2024-06-21 ENCOUNTER — TELEPHONE (OUTPATIENT)
Dept: PRIMARY CARE | Facility: CLINIC | Age: 79
End: 2024-06-21
Payer: MEDICARE

## 2024-06-22 ENCOUNTER — APPOINTMENT (OUTPATIENT)
Dept: LAB | Facility: LAB | Age: 79
End: 2024-06-22
Payer: MEDICARE

## 2024-06-22 ENCOUNTER — OFFICE VISIT (OUTPATIENT)
Dept: PRIMARY CARE | Facility: CLINIC | Age: 79
End: 2024-06-22
Payer: MEDICARE

## 2024-06-22 VITALS
BODY MASS INDEX: 18.89 KG/M2 | SYSTOLIC BLOOD PRESSURE: 132 MMHG | TEMPERATURE: 97.7 F | WEIGHT: 120.6 LBS | HEART RATE: 61 BPM | DIASTOLIC BLOOD PRESSURE: 75 MMHG | OXYGEN SATURATION: 95 %

## 2024-06-22 DIAGNOSIS — M89.8X6 PAIN OF RIGHT TIBIA: Primary | ICD-10-CM

## 2024-06-22 PROBLEM — E46 PROTEIN-CALORIE MALNUTRITION, UNSPECIFIED SEVERITY (MULTI): Status: RESOLVED | Noted: 2023-12-11 | Resolved: 2024-06-22

## 2024-06-22 PROCEDURE — 1157F ADVNC CARE PLAN IN RCRD: CPT | Performed by: FAMILY MEDICINE

## 2024-06-22 PROCEDURE — 1159F MED LIST DOCD IN RCRD: CPT | Performed by: FAMILY MEDICINE

## 2024-06-22 PROCEDURE — 99213 OFFICE O/P EST LOW 20 MIN: CPT | Performed by: FAMILY MEDICINE

## 2024-06-22 PROCEDURE — 1160F RVW MEDS BY RX/DR IN RCRD: CPT | Performed by: FAMILY MEDICINE

## 2024-06-22 PROCEDURE — 1123F ACP DISCUSS/DSCN MKR DOCD: CPT | Performed by: FAMILY MEDICINE

## 2024-06-22 NOTE — PROGRESS NOTES
Subjective   Patient ID: Jeanette Del Toro is a 78 y.o. female who presents for right leg pain.    HPI the patient had a fall 3 weeks ago where she tripped on a step and fell on her left ankle.  She had her right leg twist and fall on the left leg.  She was having some left ankle swelling and pain and saw her podiatrist.  They ordered x-rays and there were no concerns.  Her left ankle has been healing steadily.  However she complains of right tibial pain.  She states that it has not changed in the last 3 weeks and she is not sure if there is anything more concerning.  Patient does have osteoporosis and is on Fosamax.    Review of Systems  Constitutional: No fever or chills  Cardiovascular: no chest pain, no palpitations and no syncope.   Respiratory: no cough, no shortness of breath during exertion and no shortness of breath at rest.   Gastrointestinal: no abdominal pain, no nausea and no vomiting.  Neuro: No Headache, no dizziness    Objective   /75   Pulse 61   Temp 36.5 °C (97.7 °F)   Wt 54.7 kg (120 lb 9.6 oz)   SpO2 95%   BMI 18.89 kg/m²     Physical Exam  Constitutional: Alert and in no acute distress. Well developed, well nourished  Head and Face: Head and face: Normal.    Cardiovascular: Heart rate and rhythm were normal, normal S1 and S2. No peripheral edema.   Pulmonary: No respiratory distress. Clear bilateral breath sounds.  Musculoskeletal: Gait and station: Normal. Muscle strength/tone: Normal.  Tenderness on the anterior shaft of the tibia  Skin: Normal skin color and pigmentation, normal skin turgor, and no rash.    Psychiatric: Judgment and insight: Intact. Mood and affect: Normal.    Lab Results   Component Value Date    WBC 5.3 12/08/2023    HGB 13.8 12/08/2023    HCT 43.6 12/08/2023     12/08/2023    CHOL 223 (H) 12/08/2023    TRIG 69 12/08/2023    HDL 80.4 12/08/2023    ALT 17 12/08/2023    AST 17 12/08/2023     12/08/2023    K 4.6 12/08/2023     12/08/2023     CREATININE 0.81 12/08/2023    BUN 16 12/08/2023    CO2 27 12/08/2023    TSH 0.99 12/08/2023    HGBA1C 5.5 12/08/2023           Assessment/Plan   Diagnoses and all orders for this visit:  Pain of right tibia  -     XR tibia fibula right 2 views; Future        Dear Jeanette Del Toro     It was my pleasure to take care of you today in the office. Below are the things we discussed today:    1.  Pain of right tibia after a fall, advised patient to ice the area and advised her it is most likely a contusion.  However ordered an x-ray to rule out any small breaks considering her osteoporosis      Your yearly Physical is due in: December 2024  When you call the office for your yearly Physical, please ask them to inform me to order your blood work, so that you can get the fasting blood work before your appointment and we can discuss the results at your physical.      Please call me if any questions arise from now until your next visit. I will call you after I am done seeing patients. A Doctor is always available by phone when the office is closed. Please feel free to call for help with any problem that you feel shouldn't wait until the office re-opens.     Lali Corbin MD

## 2024-06-24 ENCOUNTER — TREATMENT (OUTPATIENT)
Dept: PHYSICAL THERAPY | Facility: CLINIC | Age: 79
End: 2024-06-24
Payer: MEDICARE

## 2024-06-24 ENCOUNTER — TELEPHONE (OUTPATIENT)
Dept: PRIMARY CARE | Facility: CLINIC | Age: 79
End: 2024-06-24

## 2024-06-24 DIAGNOSIS — R15.2 INCONTINENCE OF FECES WITH FECAL URGENCY: ICD-10-CM

## 2024-06-24 DIAGNOSIS — N39.46 MIXED STRESS AND URGE URINARY INCONTINENCE: ICD-10-CM

## 2024-06-24 DIAGNOSIS — R15.9 INCONTINENCE OF FECES WITH FECAL URGENCY: ICD-10-CM

## 2024-06-24 DIAGNOSIS — M89.8X6 PAIN OF RIGHT TIBIA: Primary | ICD-10-CM

## 2024-06-24 DIAGNOSIS — N39.46 URINARY INCONTINENCE, MIXED: ICD-10-CM

## 2024-06-24 PROCEDURE — 97530 THERAPEUTIC ACTIVITIES: CPT | Mod: GP

## 2024-06-24 PROCEDURE — 97110 THERAPEUTIC EXERCISES: CPT | Mod: GP

## 2024-06-24 ASSESSMENT — PAIN - FUNCTIONAL ASSESSMENT: PAIN_FUNCTIONAL_ASSESSMENT: 0-10

## 2024-06-24 ASSESSMENT — PAIN SCALES - GENERAL: PAINLEVEL_OUTOF10: 0 - NO PAIN

## 2024-06-24 NOTE — PROGRESS NOTES
Physical Therapy Treatment & Discharge Summary    Patient Name: Jeanette Del Toro  MRN: [id number]  Today's Date: 6/24/2024  Time Calculation  Start Time: 0930  Stop Time: 1030  Time Calculation (min): 60 min  PT Therapeutic Procedures Time Entry  Therapeutic Exercise Time Entry: 15  Therapeutic Activity Time Entry: 38    Insurance:  Visit number: 16 of 20  Authorization info: [place] visits  Insurance Type: Payor: [facility] MEDICARE / Plan: [employer]IC[employer] / Product Type: *No Product type* /     Current Problem   1. Mixed stress and urge urinary incontinence  Follow Up In Physical Therapy      2. Incontinence of feces with fecal urgency  Follow Up In Physical Therapy      3. Urinary incontinence, mixed  Follow Up In Physical Therapy          Subjective   General    Tripped over large comforter and fell, injuring ankle since here last. Getting better. Xrays negative. Also has some pain R tibia. Will have xray later today. As far as bowels go, she still feels more in control than she has in a while. Has not had episodes of FI recently. Still taking psyllium husk powder, recently switched to capsules because she didn't like some of the ingredients in the powder. With powder was [place] 3-4, now 1 or 3.   Has not been exercising much because of her leg injury, but knows it is important for her.    Precautions:  Precautions  STEADI Fall Risk Score (The score of 4 or more indicates an increased risk of falling): 0    Pain   Pain Assessment: 0-10  0-10 (Numeric) Pain Score: 0 - No pain  Post Treatment Pain Level 0    Objective   As of 6/4/24 visit:  Internal (rectal) assessment of pelvic floor mm in L sidelying position:     Observation: mild erythema perianal tissue     Anal wink reflex: intact     External Anal spincter:  Pain: none  Tone: very mild hypotonic anal sphincter  Strength: good- (8s endurance)  Relaxation slight delay  Coordination: good  Coccygeus: non tender  Pubococcygeus strength = good      Treatments:  Therapeutic Exercise:  Therapeutic Exercise  Therapeutic Exercise Performed: Yes  Therapeutic Exercise Activity 1: Review of HEP folder and recommended she continue to focus on two groups of exercise: relaxation to promote complete bowel and bladder emptying, as well as well as strengthening to support closure.  Therapeutic Exercise Activity 2: Reviwed/added: standing, sitting, half kneeling, stagger stance with pfm isolation.    Therapeutic activity:  Therapeutic Activity  Therapeutic Activity Performed: Yes  Therapeutic Activity 1: Lengthy discussion re: benefits of fiber for stool consistency, bowel continence and impact this may have on bladder as well.  Therapeutic Activity 2: Educated patient in potential impact of topical estrogen on vulvar tissue, including urethra and potential benefit in small urine leakage she is having. Suggested patient may want to discuss with her medical provider. This PT will send message to patient's PCP Dr. Corbin.       Assessment   Assessment:    Overall, patient is managing bowel incontinence very well. She is keenly aware of impact that fiber (dietary and supplementary) have on bowels and the importance of exercise. Patient continues to have a small amount of UI frequently, which does not seem to be improved with exercises. Patient many benefit from topical estrogen form improved urethral integrity and closure.    Plan:    Discharge to [facility]. This PT is leaving the organization and patient feels she would like to continue I for now. She will seek PFPT consult in the coming months as needed.    OP EDUCATION:   See above.     Goals:   Resolved       PT LTGs       demos pelvic floor mm strength at least 3/5 with normal/complete relaxation after contract for improved continence of bladder and bowel  (Met)       Start:  09/28/23    Expected End:  02/18/24    Resolved:  02/06/24         demos stool consistency 2-4  for improved efficiency of holding stool  (Met)        Start:  09/28/23    Expected End:  02/18/24    Resolved:  01/11/24         demos I with progressed home exercise program with proper breath coordination for iAP management  (Met)       Start:  09/28/23    Expected End:  02/18/24    Resolved:  01/11/24         report episodes of UI and FI reduced by 75% or more  (Progressing)       Start:  09/28/23    Expected End:  09/02/24    Resolved:  06/24/24         report nocturia 2x or less (Met)       Start:  09/28/23    Expected End:  02/18/24    Resolved:  01/11/24             Patient will report abdominal wall pain improved by 75% or more most of the time (Met)       Start:  02/06/24    Expected End:  03/15/24    Resolved:  04/02/24             Patient will demos good EAS strength with endurance of 10s or more as indication of improved FI (Progressing)       Start:  06/04/24    Expected End:  09/02/24    Resolved:  06/24/24                PT STGs       demos I with exercises issued thus far with proper breath coordination and no UI  (Met)       Start:  09/28/23    Expected End:  10/31/23    Resolved:  11/09/23         consistently demos pelvic floor mm excursion in coordination with breath cycle  (Met)       Start:  09/28/23    Expected End:  11/30/23    Resolved:  11/20/23         report sucess with urge suppression strategies 50% of the time with bowel/bladder urgency (Met)       Start:  09/28/23    Expected End:  12/15/23    Resolved:  12/21/23

## 2024-06-27 ENCOUNTER — HOSPITAL ENCOUNTER (OUTPATIENT)
Dept: RADIOLOGY | Facility: CLINIC | Age: 79
Discharge: HOME | End: 2024-06-27
Payer: MEDICARE

## 2024-06-27 ENCOUNTER — APPOINTMENT (OUTPATIENT)
Dept: PHYSICAL THERAPY | Facility: CLINIC | Age: 79
End: 2024-06-27
Payer: MEDICARE

## 2024-06-27 DIAGNOSIS — M89.8X6 PAIN OF RIGHT TIBIA: ICD-10-CM

## 2024-06-27 DIAGNOSIS — Z78.0 ASYMPTOMATIC MENOPAUSAL STATE: Primary | ICD-10-CM

## 2024-06-27 PROCEDURE — 73590 X-RAY EXAM OF LOWER LEG: CPT | Mod: RT

## 2024-06-27 PROCEDURE — 73562 X-RAY EXAM OF KNEE 3: CPT | Mod: RT

## 2024-06-27 PROCEDURE — 73562 X-RAY EXAM OF KNEE 3: CPT | Mod: RIGHT SIDE | Performed by: RADIOLOGY

## 2024-06-27 RX ORDER — ESTRADIOL 0.1 MG/G
2 CREAM VAGINAL DAILY
Qty: 42.5 G | Refills: 5 | Status: SHIPPED | OUTPATIENT
Start: 2024-06-27

## 2024-07-31 ENCOUNTER — TELEPHONE (OUTPATIENT)
Dept: PRIMARY CARE | Facility: CLINIC | Age: 79
End: 2024-07-31
Payer: MEDICARE

## 2024-07-31 DIAGNOSIS — M54.50 MIDLINE LOW BACK PAIN WITHOUT SCIATICA, UNSPECIFIED CHRONICITY: Primary | ICD-10-CM

## 2024-08-01 RX ORDER — TIZANIDINE 2 MG/1
2 TABLET ORAL EVERY 6 HOURS PRN
Qty: 30 TABLET | Refills: 0 | Status: SHIPPED | OUTPATIENT
Start: 2024-08-01 | End: 2024-08-11

## 2024-12-06 ENCOUNTER — LAB (OUTPATIENT)
Dept: LAB | Facility: LAB | Age: 79
End: 2024-12-06
Payer: MEDICARE

## 2024-12-06 DIAGNOSIS — R73.9 ELEVATED BLOOD SUGAR: ICD-10-CM

## 2024-12-06 DIAGNOSIS — E55.9 VITAMIN D DEFICIENCY: Primary | ICD-10-CM

## 2024-12-06 DIAGNOSIS — E78.2 MODERATE MIXED HYPERLIPIDEMIA NOT REQUIRING STATIN THERAPY: ICD-10-CM

## 2024-12-06 DIAGNOSIS — R94.6 ABNORMAL THYROID EXAM: ICD-10-CM

## 2024-12-06 DIAGNOSIS — E55.9 VITAMIN D DEFICIENCY: ICD-10-CM

## 2024-12-06 LAB
25(OH)D3 SERPL-MCNC: 40 NG/ML (ref 30–100)
ALBUMIN SERPL BCP-MCNC: 4.2 G/DL (ref 3.4–5)
ALP SERPL-CCNC: 60 U/L (ref 33–136)
ALT SERPL W P-5'-P-CCNC: 18 U/L (ref 7–45)
ANION GAP SERPL CALC-SCNC: 12 MMOL/L (ref 10–20)
AST SERPL W P-5'-P-CCNC: 19 U/L (ref 9–39)
BILIRUB SERPL-MCNC: 0.7 MG/DL (ref 0–1.2)
BUN SERPL-MCNC: 21 MG/DL (ref 6–23)
CALCIUM SERPL-MCNC: 9.5 MG/DL (ref 8.6–10.6)
CHLORIDE SERPL-SCNC: 105 MMOL/L (ref 98–107)
CHOLEST SERPL-MCNC: 244 MG/DL (ref 0–199)
CHOLESTEROL/HDL RATIO: 3.2
CO2 SERPL-SCNC: 27 MMOL/L (ref 21–32)
CREAT SERPL-MCNC: 0.82 MG/DL (ref 0.5–1.05)
EGFRCR SERPLBLD CKD-EPI 2021: 74 ML/MIN/1.73M*2
ERYTHROCYTE [DISTWIDTH] IN BLOOD BY AUTOMATED COUNT: 13.3 % (ref 11.5–14.5)
EST. AVERAGE GLUCOSE BLD GHB EST-MCNC: 111 MG/DL
GLUCOSE SERPL-MCNC: 75 MG/DL (ref 74–99)
HBA1C MFR BLD: 5.5 %
HCT VFR BLD AUTO: 46 % (ref 36–46)
HDLC SERPL-MCNC: 77.4 MG/DL
HGB BLD-MCNC: 14.4 G/DL (ref 12–16)
LDLC SERPL CALC-MCNC: 154 MG/DL
MCH RBC QN AUTO: 29.1 PG (ref 26–34)
MCHC RBC AUTO-ENTMCNC: 31.3 G/DL (ref 32–36)
MCV RBC AUTO: 93 FL (ref 80–100)
NON HDL CHOLESTEROL: 167 MG/DL (ref 0–149)
NRBC BLD-RTO: 0 /100 WBCS (ref 0–0)
PLATELET # BLD AUTO: 262 X10*3/UL (ref 150–450)
POTASSIUM SERPL-SCNC: 4.6 MMOL/L (ref 3.5–5.3)
PROT SERPL-MCNC: 6.6 G/DL (ref 6.4–8.2)
RBC # BLD AUTO: 4.94 X10*6/UL (ref 4–5.2)
SODIUM SERPL-SCNC: 139 MMOL/L (ref 136–145)
TRIGL SERPL-MCNC: 62 MG/DL (ref 0–149)
TSH SERPL-ACNC: 0.89 MIU/L (ref 0.44–3.98)
VIT B12 SERPL-MCNC: 461 PG/ML (ref 211–911)
VLDL: 12 MG/DL (ref 0–40)
WBC # BLD AUTO: 5.6 X10*3/UL (ref 4.4–11.3)

## 2024-12-06 PROCEDURE — 84443 ASSAY THYROID STIM HORMONE: CPT

## 2024-12-06 PROCEDURE — 85027 COMPLETE CBC AUTOMATED: CPT

## 2024-12-06 PROCEDURE — 82306 VITAMIN D 25 HYDROXY: CPT

## 2024-12-06 PROCEDURE — 82607 VITAMIN B-12: CPT

## 2024-12-06 PROCEDURE — 83036 HEMOGLOBIN GLYCOSYLATED A1C: CPT

## 2024-12-06 PROCEDURE — 80061 LIPID PANEL: CPT

## 2024-12-06 PROCEDURE — 80053 COMPREHEN METABOLIC PANEL: CPT

## 2024-12-06 PROCEDURE — 36415 COLL VENOUS BLD VENIPUNCTURE: CPT

## 2024-12-06 NOTE — PROGRESS NOTES
Subjective   Patient ID: Jeanette Del Toro is a 78 y.o. female who presents for No chief complaint on file..  HPI    Review of Systems    Objective   Physical Exam    Assessment/Plan   {Assess/PlanSmartLinks:[id number]}         Lali Corbin MD 12/06/24 8:27 AM

## 2024-12-11 ENCOUNTER — APPOINTMENT (OUTPATIENT)
Dept: PRIMARY CARE | Facility: CLINIC | Age: 79
End: 2024-12-11
Payer: MEDICARE

## 2024-12-11 VITALS
WEIGHT: 123 LBS | BODY MASS INDEX: 19.3 KG/M2 | HEIGHT: 67 IN | HEART RATE: 63 BPM | OXYGEN SATURATION: 98 % | DIASTOLIC BLOOD PRESSURE: 72 MMHG | SYSTOLIC BLOOD PRESSURE: 110 MMHG

## 2024-12-11 DIAGNOSIS — M81.0 AGE-RELATED OSTEOPOROSIS WITHOUT CURRENT PATHOLOGICAL FRACTURE: ICD-10-CM

## 2024-12-11 DIAGNOSIS — E78.2 MODERATE MIXED HYPERLIPIDEMIA NOT REQUIRING STATIN THERAPY: ICD-10-CM

## 2024-12-11 DIAGNOSIS — Z12.31 ENCOUNTER FOR SCREENING MAMMOGRAM FOR BREAST CANCER: ICD-10-CM

## 2024-12-11 DIAGNOSIS — Z00.00 MEDICARE ANNUAL WELLNESS VISIT, SUBSEQUENT: Primary | ICD-10-CM

## 2024-12-11 PROCEDURE — 1036F TOBACCO NON-USER: CPT | Performed by: FAMILY MEDICINE

## 2024-12-11 PROCEDURE — 1160F RVW MEDS BY RX/DR IN RCRD: CPT | Performed by: FAMILY MEDICINE

## 2024-12-11 PROCEDURE — 99397 PER PM REEVAL EST PAT 65+ YR: CPT | Performed by: FAMILY MEDICINE

## 2024-12-11 PROCEDURE — 1123F ACP DISCUSS/DSCN MKR DOCD: CPT | Performed by: FAMILY MEDICINE

## 2024-12-11 PROCEDURE — 1170F FXNL STATUS ASSESSED: CPT | Performed by: FAMILY MEDICINE

## 2024-12-11 PROCEDURE — 1157F ADVNC CARE PLAN IN RCRD: CPT | Performed by: FAMILY MEDICINE

## 2024-12-11 PROCEDURE — 1159F MED LIST DOCD IN RCRD: CPT | Performed by: FAMILY MEDICINE

## 2024-12-11 PROCEDURE — G0439 PPPS, SUBSEQ VISIT: HCPCS | Performed by: FAMILY MEDICINE

## 2024-12-11 PROCEDURE — 99213 OFFICE O/P EST LOW 20 MIN: CPT | Performed by: FAMILY MEDICINE

## 2024-12-11 RX ORDER — ALENDRONATE SODIUM 70 MG/1
70 TABLET ORAL
Qty: 12 TABLET | Refills: 3 | Status: SHIPPED | OUTPATIENT
Start: 2024-12-11

## 2024-12-11 ASSESSMENT — PATIENT HEALTH QUESTIONNAIRE - PHQ9
SUM OF ALL RESPONSES TO PHQ9 QUESTIONS 1 AND 2: 0
1. LITTLE INTEREST OR PLEASURE IN DOING THINGS: NOT AT ALL
2. FEELING DOWN, DEPRESSED OR HOPELESS: NOT AT ALL

## 2024-12-11 ASSESSMENT — ACTIVITIES OF DAILY LIVING (ADL)
TAKING_MEDICATION: INDEPENDENT
BATHING: INDEPENDENT
DOING_HOUSEWORK: INDEPENDENT
MANAGING_FINANCES: INDEPENDENT
GROCERY_SHOPPING: INDEPENDENT
DRESSING: INDEPENDENT

## 2024-12-11 NOTE — PROGRESS NOTES
"Subjective   Patient ID: Jeanette Del Toro is a 78 y.o. female who presents for Medicare Annual Wellness Visit Subsequent.    HPI the patient states that she is doing well and has no real complaints.  She has been taking her alendronate as prescribed and denies having any complaints at this time.    Review of Systems  Constitutional: No fever or chills, No Night Sweats  Eyes: No Blurry Vision or Eye sight problems  ENT: No Nasal Discharge, Hoarseness, sore throat  Cardiovascular: no chest pain, no palpitations and no syncope.   Respiratory: no cough, no shortness of breath during exertion and no shortness of breath at rest.   Gastrointestinal: no abdominal pain, no nausea and no vomiting.   : No vaginal discharge, burning with urination, no blood in urine or stools  Skin: No Skin rashes or Lesions  Neuro: No Headache, no dizziness or Numbness or tingling  Psych: No Anxiety, depression or sleeping problems  Heme: No Easy bleeding or brusing.     Objective   /72   Pulse 63   Ht 1.702 m (5' 7\")   Wt 55.8 kg (123 lb)   SpO2 98%   BMI 19.26 kg/m²     Physical Exam  Constitutional: Alert and in no acute distress. Well developed, well nourished.   Head and Face: Head and face: Normal.    Eyes: Normal external exam. Pupils were equal in size, round, reactive to light (PERRL) with normal accommodation and extraocular movements intact (EOMI).   Ears, Nose, Mouth, and Throat: External inspection of ears and nose: Normal.  Hearing: Normal.  Nasal mucosa, septum, and turbinates: Normal.  Lips, teeth, and gums: Normal.  Oropharynx: Normal.   Neck: No neck mass was observed. Supple. Thyroid not enlarged and there were no palpable thyroid nodules.   Cardiovascular: Heart rate and rhythm were normal, normal S1 and S2. Pedal pulses: Normal. No peripheral edema.   Pulmonary: No respiratory distress. Clear bilateral breath sounds.   Abdomen: Soft nontender; no abdominal mass palpated. Normal bowel sounds. No " organomegaly.   Musculoskeletal: No joint swelling seen, normal movements of all extremities. Range of motion: Normal.  Muscle strength/tone: Normal.    Skin: Normal skin color and pigmentation, normal skin turgor, and no rash.   Neurologic: Deep tendon reflexes were 2+ and symmetric.   Psychiatric: Judgment and insight: Intact. Mood and affect: Normal.  Lymphatic: No cervical lymphadenopathy. Palpation of lymph nodes in axillae: Normal.  Palpation of lymph nodes in groin: Normal.    Lab Results   Component Value Date    WBC 5.6 12/06/2024    HGB 14.4 12/06/2024    HCT 46.0 12/06/2024     12/06/2024    CHOL 244 (H) 12/06/2024    TRIG 62 12/06/2024    HDL 77.4 12/06/2024    ALT 18 12/06/2024    AST 19 12/06/2024     12/06/2024    K 4.6 12/06/2024     12/06/2024    CREATININE 0.82 12/06/2024    BUN 21 12/06/2024    CO2 27 12/06/2024    TSH 0.89 12/06/2024    HGBA1C 5.5 12/06/2024       XR knee right 3 views  Narrative: Interpreted By:  Joellen Galloway,   STUDY:  Right knee, 3 views.      INDICATION:  Signs/Symptoms:knee pain after fall      COMPARISON:  None.      ACCESSION NUMBER(S):  [id number]      ORDERING CLINICIAN:  DARIELA TORRES      FINDINGS:  No acute fracture or malalignment.  Joint spaces are well preserved.      No significant knee joint effusion.  Soft tissues are unremarkable.      Impression: 1. No acute fracture or malalignment of the right knee.      MACRO:  None.      Signed by: Joellen Galloway 6/28/2024 6:07 PM  Dictation workstation:   [id number]  XR tibia fibula right 2 views  Narrative: Interpreted By:  Joellen Galloway,   STUDY:  Right tibia, two views      INDICATION:  Signs/Symptoms:s/p fall pain in mid tibia.      COMPARISON:  None.      ACCESSION NUMBER(S):  [id number]      ORDERING CLINICIAN:  DARIELA TORRES      FINDINGS:  No acute fracture or malalignment. No significant degenerative  changes. Visualized knee and ankle joints are unremarkable.  Soft tissues are  unremarkable.      Impression: No acute fracture or malalignment of the right tibia.      MACRO:  None.      Signed by: Joellen Garvinstephane 6/28/2024 6:06 PM  Dictation workstation:   [id number]      Assessment/Plan   Diagnoses and all orders for this visit:  Medicare annual wellness visit, subsequent  -     Follow Up In [facility] - PCP - Medicare Annual  Age-related osteoporosis without current pathological fracture        Dear Jeanette Del Toro     It was my pleasure to take care of you today in the office. Below are the things we discussed today:    1. 1. Immunizations: Yearly Flu shot is recommended.          a: COVID: Up-to-Date         b: Tetanus: Up-to-date         c: Shingrix: Up-to-date         d: RSV: Up-to-date         e: Prevnar: Up-to-date    2. Blood Work: Reviewed  3. Seen your dentist twice a year  4. Yearly Eye exam is recommended    5. BMI: NOrmal  6: Diet recommendations:   Eat Clean, Try to have as many home cooked meals as possible  Avoid processed foods which contain excess calories, sugar, and sodium.    7. Exercise recommendations:   150 minutes a week to maintain your weight     If you have to loose weight, you need a better diet and exercise plan.     8. Supplements recommended:  a - Calcium 600 mg up to twice a day to get a total of 1200 mg. Each 8 oz of milk or yogurt or 1 oz of cheese, 1 Banana, 1 serving of green Leafy vegetable has about 300 mg of Calcium, so you may subtract that amount. Calcium citrate is the only acceptable supplement to take if you take an acid suppressing medication like Prilosec; otherwise Calcium carbonate is acceptable too (It can cause Constipation).   b - Vitamin D - 2000 IU daily     9. Please get your Living will / Advance directive completed if you do not have one already. Please make sure our office has a copy of the latest one.     10. Colonoscopy: Uptodate, No more repeats needed  11. Mammogram: Uptodate, Ordered for Feb 2025  12. PAP: Not  Indicated  13. DEXA: Uptodate  14: Skin Check: Please see Dermatology once a year for a Skin Check.     15.  Osteoporosis, continue alendronate    Follow up in one year for a Physical. Please call the office before your Physical to see if you need blood work completed prior to your physical.     Please call me if any questions arise from now until your next visit. I will call you after I am done seeing patients. A Doctor is always available by phone when the office is closed. Please feel free to call for help with any problem that you feel shouldn't wait until the office re-opens.     Lali Corbin MD

## 2025-01-02 ENCOUNTER — LAB (OUTPATIENT)
Dept: LAB | Facility: LAB | Age: 80
End: 2025-01-02
Payer: MEDICARE

## 2025-01-03 ENCOUNTER — LAB (OUTPATIENT)
Dept: LAB | Facility: LAB | Age: 80
End: 2025-01-03
Payer: MEDICARE

## 2025-01-03 DIAGNOSIS — E78.2 MODERATE MIXED HYPERLIPIDEMIA NOT REQUIRING STATIN THERAPY: ICD-10-CM

## 2025-01-03 LAB
CHOLEST SERPL-MCNC: 252 MG/DL (ref 0–199)
CHOLESTEROL/HDL RATIO: 2.9
HDLC SERPL-MCNC: 86.7 MG/DL
LDLC SERPL CALC-MCNC: 153 MG/DL
NON HDL CHOLESTEROL: 165 MG/DL (ref 0–149)
TRIGL SERPL-MCNC: 62 MG/DL (ref 0–149)
VLDL: 12 MG/DL (ref 0–40)

## 2025-01-03 PROCEDURE — 36415 COLL VENOUS BLD VENIPUNCTURE: CPT

## 2025-01-03 PROCEDURE — 80061 LIPID PANEL: CPT

## 2025-01-03 PROCEDURE — 82172 ASSAY OF APOLIPOPROTEIN: CPT

## 2025-01-05 LAB — LPA SERPL-MCNC: 25 MG/DL

## 2025-01-21 ENCOUNTER — APPOINTMENT (OUTPATIENT)
Dept: PRIMARY CARE | Facility: CLINIC | Age: 80
End: 2025-01-21
Payer: MEDICARE

## 2025-01-21 VITALS
HEART RATE: 66 BPM | BODY MASS INDEX: 19.78 KG/M2 | HEIGHT: 67 IN | OXYGEN SATURATION: 97 % | DIASTOLIC BLOOD PRESSURE: 76 MMHG | WEIGHT: 126 LBS | SYSTOLIC BLOOD PRESSURE: 126 MMHG

## 2025-01-21 DIAGNOSIS — E78.01 FAMILIAL HYPERCHOLESTEROLEMIA: Primary | ICD-10-CM

## 2025-01-21 PROCEDURE — 1160F RVW MEDS BY RX/DR IN RCRD: CPT | Performed by: FAMILY MEDICINE

## 2025-01-21 PROCEDURE — 1123F ACP DISCUSS/DSCN MKR DOCD: CPT | Performed by: FAMILY MEDICINE

## 2025-01-21 PROCEDURE — G2211 COMPLEX E/M VISIT ADD ON: HCPCS | Performed by: FAMILY MEDICINE

## 2025-01-21 PROCEDURE — 1157F ADVNC CARE PLAN IN RCRD: CPT | Performed by: FAMILY MEDICINE

## 2025-01-21 PROCEDURE — 1159F MED LIST DOCD IN RCRD: CPT | Performed by: FAMILY MEDICINE

## 2025-01-21 PROCEDURE — 1036F TOBACCO NON-USER: CPT | Performed by: FAMILY MEDICINE

## 2025-01-21 PROCEDURE — 99214 OFFICE O/P EST MOD 30 MIN: CPT | Performed by: FAMILY MEDICINE

## 2025-01-21 RX ORDER — UMECLIDINIUM 62.5 UG/1
AEROSOL, POWDER ORAL
COMMUNITY
Start: 2024-12-13

## 2025-01-21 RX ORDER — IPRATROPIUM BROMIDE 0.5 MG/2.5ML
0.5 SOLUTION RESPIRATORY (INHALATION) EVERY 6 HOURS PRN
COMMUNITY
Start: 2025-01-20 | End: 2025-02-19

## 2025-01-21 RX ORDER — ATORVASTATIN CALCIUM 10 MG/1
10 TABLET, FILM COATED ORAL DAILY
Qty: 90 TABLET | Refills: 3 | Status: SHIPPED | OUTPATIENT
Start: 2025-01-21

## 2025-01-21 NOTE — PROGRESS NOTES
"Subjective   Patient ID: Jeanette Del Toro is a 79 y.o. female who presents for Follow-up (6 Week).    HPI    The patient presents to review her labs.  She has been taking her alendronate as prescribed and denies having any complaints at this time.     She is also complaining of IBS symptoms.   Review of Systems  Constitutional: No fever or chills  Cardiovascular: no chest pain, no palpitations and no syncope.   Respiratory: no cough, no shortness of breath during exertion and no shortness of breath at rest.   Gastrointestinal: no abdominal pain, no nausea and no vomiting.  Neuro: No Headache, no dizziness    Objective   /76   Pulse 66   Ht 1.702 m (5' 7\")   Wt 57.2 kg (126 lb)   SpO2 97%   BMI 19.73 kg/m²     Physical Exam  Constitutional: Alert and in no acute distress. Well developed, well nourished  Head and Face: Head and face: Normal.    Cardiovascular: Heart rate and rhythm were normal, normal S1 and S2. No peripheral edema.   Pulmonary: No respiratory distress. Clear bilateral breath sounds.  Musculoskeletal: Gait and station: Normal. Muscle strength/tone: Normal.   Skin: Normal skin color and pigmentation, normal skin turgor, and no rash.    Psychiatric: Judgment and insight: Intact. Mood and affect: Normal.      Lab Results   Component Value Date    WBC 5.6 12/06/2024    HGB 14.4 12/06/2024    HCT 46.0 12/06/2024     12/06/2024    CHOL 252 (H) 01/03/2025    TRIG 62 01/03/2025    HDL 86.7 01/03/2025    ALT 18 12/06/2024    AST 19 12/06/2024     12/06/2024    K 4.6 12/06/2024     12/06/2024    CREATININE 0.82 12/06/2024    BUN 21 12/06/2024    CO2 27 12/06/2024    TSH 0.89 12/06/2024    HGBA1C 5.5 12/06/2024       XR knee right 3 views  Narrative: Interpreted By:  Joellen Galloway,   STUDY:  Right knee, 3 views.      INDICATION:  Signs/Symptoms:knee pain after fall      COMPARISON:  None.      ACCESSION NUMBER(S):  [id number]      ORDERING CLINICIAN:  DARIELA TORRES"   FINDINGS:  No acute fracture or malalignment.  Joint spaces are well preserved.      No significant knee joint effusion.  Soft tissues are unremarkable.      Impression: 1. No acute fracture or malalignment of the right knee.      MACRO:  None.      Signed by: Joellen Galloway 6/28/2024 6:07 PM  Dictation workstation:   [id number]  XR tibia fibula right 2 views  Narrative: Interpreted By:  Joellen Galloway,   STUDY:  Right tibia, two views      INDICATION:  Signs/Symptoms:s/p fall pain in mid tibia.      COMPARISON:  None.      ACCESSION NUMBER(S):  [id number]      ORDERING CLINICIAN:  LALI CORBIN      FINDINGS:  No acute fracture or malalignment. No significant degenerative  changes. Visualized knee and ankle joints are unremarkable.  Soft tissues are unremarkable.      Impression: No acute fracture or malalignment of the right tibia.      MACRO:  None.      Signed by: Joellen Galloway 6/28/2024 6:06 PM  Dictation workstation:   [id number]      Assessment/Plan   Assessment & Plan  Familial hypercholesterolemia    Start 10 mg for 3 months.   Orders:    atorvastatin (Lipitor) 10 mg tablet; Take 1 tablet (10 mg) by mouth once daily.    Lipid Panel; Future    Comprehensive Metabolic Panel; Future            Your yearly Physical is due in: December 2025.   When you call the office for your yearly Physical, please ask them to inform me to order your blood work, so that you can get the fasting blood work before your appointment and we can discuss the results at your physical.      Please call me if any questions arise from now until your next visit. I will call you after I am done seeing patients. A Doctor is always available by phone when the office is closed. Please feel free to call for help with any problem that you feel shouldn't wait until the office re-opens.     Scribe Attestation  By signing my name below, I, Lali Corbin MD, Scribe attest that this documentation has been prepared under the direction  and in the presence of Lali Corbin MD. All medical record entries made by the Scribe were at my direction or personally dictated by me. I have reviewed the chart and agree that the record accurately reflects my personal performance of the history, physical exam, discussion and plan.

## 2025-02-10 ENCOUNTER — HOSPITAL ENCOUNTER (OUTPATIENT)
Dept: RADIOLOGY | Facility: CLINIC | Age: 80
Discharge: HOME | End: 2025-02-10
Payer: MEDICARE

## 2025-02-10 VITALS — BODY MASS INDEX: 19.3 KG/M2 | WEIGHT: 123 LBS | HEIGHT: 67 IN

## 2025-02-10 DIAGNOSIS — Z12.31 ENCOUNTER FOR SCREENING MAMMOGRAM FOR BREAST CANCER: ICD-10-CM

## 2025-02-10 PROCEDURE — 77063 BREAST TOMOSYNTHESIS BI: CPT | Performed by: RADIOLOGY

## 2025-02-10 PROCEDURE — 77067 SCR MAMMO BI INCL CAD: CPT

## 2025-02-10 PROCEDURE — 77067 SCR MAMMO BI INCL CAD: CPT | Performed by: RADIOLOGY

## 2025-02-24 ENCOUNTER — OFFICE VISIT (OUTPATIENT)
Dept: PRIMARY CARE | Facility: CLINIC | Age: 80
End: 2025-02-24
Payer: MEDICARE

## 2025-02-24 VITALS
DIASTOLIC BLOOD PRESSURE: 76 MMHG | HEART RATE: 58 BPM | OXYGEN SATURATION: 98 % | SYSTOLIC BLOOD PRESSURE: 118 MMHG | HEIGHT: 68 IN | BODY MASS INDEX: 18.76 KG/M2 | WEIGHT: 123.8 LBS

## 2025-02-24 DIAGNOSIS — R30.0 DYSURIA: Primary | ICD-10-CM

## 2025-02-24 PROCEDURE — 1159F MED LIST DOCD IN RCRD: CPT | Performed by: FAMILY MEDICINE

## 2025-02-24 PROCEDURE — 99213 OFFICE O/P EST LOW 20 MIN: CPT | Performed by: FAMILY MEDICINE

## 2025-02-24 PROCEDURE — 1157F ADVNC CARE PLAN IN RCRD: CPT | Performed by: FAMILY MEDICINE

## 2025-02-24 PROCEDURE — 1123F ACP DISCUSS/DSCN MKR DOCD: CPT | Performed by: FAMILY MEDICINE

## 2025-02-24 PROCEDURE — 1160F RVW MEDS BY RX/DR IN RCRD: CPT | Performed by: FAMILY MEDICINE

## 2025-02-24 PROCEDURE — 1036F TOBACCO NON-USER: CPT | Performed by: FAMILY MEDICINE

## 2025-02-24 NOTE — PROGRESS NOTES
"Subjective   Patient ID: Jeanette Del Toro is a 79 y.o. female who presents for Urinary Problem (Discomfort ).    HPI   The patient has been taking her alendronate, atorvastatin, biotin, calcium, Zyrtec, Restasis, Docosahexaenoic acid, Incruse Ellipta and ipratropium as prescribed and tolerates them well.    Today she presents with concerns over a urinary tract infection that has been on and off for a month now .  She has been having irritation and sometimes burning while urinating.  Denies any fever.         Review of Systems  Constitutional: No fever or chills  Cardiovascular: no chest pain, no palpitations and no syncope.   Respiratory: no cough, no shortness of breath during exertion and no shortness of breath at rest.   Gastrointestinal: no abdominal pain, no nausea and no vomiting.  Neuro: No Headache, no dizziness    Objective   /76 (BP Location: Left arm, Patient Position: Sitting, BP Cuff Size: Adult)   Pulse 58   Ht 1.727 m (5' 8\")   Wt 56.2 kg (123 lb 12.8 oz)   SpO2 98%   BMI 18.82 kg/m²     Physical Exam  Constitutional: Alert and in no acute distress. Well developed, well nourished  Head and Face: Head and face: Normal.    Cardiovascular: Heart rate and rhythm were normal, normal S1 and S2. No peripheral edema.   Pulmonary: No respiratory distress. Clear bilateral breath sounds.  Musculoskeletal: Gait and station: Normal. Muscle strength/tone: Normal.   Skin: Normal skin color and pigmentation, normal skin turgor, and no rash.    Psychiatric: Judgment and insight: Intact. Mood and affect: Normal.      Lab Results   Component Value Date    WBC 5.6 12/06/2024    HGB 14.4 12/06/2024    HCT 46.0 12/06/2024     12/06/2024    CHOL 252 (H) 01/03/2025    TRIG 62 01/03/2025    HDL 86.7 01/03/2025    ALT 18 12/06/2024    AST 19 12/06/2024     12/06/2024    K 4.6 12/06/2024     12/06/2024    CREATININE 0.82 12/06/2024    BUN 21 12/06/2024    CO2 27 12/06/2024    TSH 0.89 " 12/06/2024    HGBA1C 5.5 12/06/2024       BI mammo bilateral screening tomosynthesis  Narrative: Interpreted By:  Rosario Blackwell,   STUDY:  BI MAMMO BILATERAL SCREENING TOMOSYNTHESIS;  2/10/2025 9:13 am      ACCESSION NUMBER(S):  [id number]      ORDERING CLINICIAN:  DARIELA TORRES      INDICATION:  Screening.      ,Z12.31 Encounter for screening mammogram for malignant neoplasm of  breast      COMPARISON:  01/05/2022 01/03/2020      FINDINGS:  2D and tomosynthesis images were reviewed at 1 mm slice thickness.      Density:  The breasts are heterogeneously dense, which may obscure  small masses.      No suspicious masses or calcifications are identified.      Impression: No mammographic evidence of malignancy.      BI-RADS CATEGORY:  BI-RADS Category:  1 Negative.  Recommendation:  Annual Screening.  Recommended Date:  1 Year.  Laterality:  Bilateral.              For any future breast imaging appointments, please call [phone]  [phone]).          MACRO:  None      Signed by: Rosario Blackwell 2/11/2025 1:56 PM  Dictation workstation:   [id number]      Assessment/Plan   Assessment & Plan  Dysuria  Ordered UA.  Orders:    Urinalysis with Reflex Culture and Microscopic; Future      Your yearly Physical is due in: Dec 2025  When you call the office for your yearly Physical, please ask them to inform me to order your blood work, so that you can get the fasting blood work before your appointment and we can discuss the results at your physical.      Please call me if any questions arise from now until your next visit. I will call you after I am done seeing patients. A Doctor is always available by phone when the office is closed. Please feel free to call for help with any problem that you feel shouldn't wait until the office re-opens.     Liban Lyons

## 2025-02-25 LAB
APPEARANCE UR: CLEAR
BACTERIA #/AREA URNS HPF: ABNORMAL /HPF
BACTERIA UR CULT: ABNORMAL
BILIRUB UR QL STRIP: NEGATIVE
COLOR UR: YELLOW
GLUCOSE UR QL STRIP: NEGATIVE
HGB UR QL STRIP: NEGATIVE
HYALINE CASTS #/AREA URNS LPF: ABNORMAL /LPF
KETONES UR QL STRIP: NEGATIVE
LEUKOCYTE ESTERASE UR QL STRIP: ABNORMAL
NITRITE UR QL STRIP: NEGATIVE
PH UR STRIP: 7 [PH] (ref 5–8)
PROT UR QL STRIP: NEGATIVE
RBC #/AREA URNS HPF: ABNORMAL /HPF
SERVICE CMNT-IMP: ABNORMAL
SP GR UR STRIP: 1.01 (ref 1–1.03)
SQUAMOUS #/AREA URNS HPF: ABNORMAL /HPF
WBC #/AREA URNS HPF: ABNORMAL /HPF

## 2025-02-27 ENCOUNTER — TELEPHONE (OUTPATIENT)
Dept: PRIMARY CARE | Facility: CLINIC | Age: 80
End: 2025-02-27
Payer: MEDICARE

## 2025-04-07 ENCOUNTER — TELEPHONE (OUTPATIENT)
Dept: PRIMARY CARE | Facility: CLINIC | Age: 80
End: 2025-04-07
Payer: MEDICARE

## 2025-04-07 NOTE — TELEPHONE ENCOUNTER
Pt called in stating she needs a order for cholesterol blood work. Good call back number [phone]

## 2025-04-10 LAB
ALBUMIN SERPL-MCNC: 4.4 G/DL (ref 3.6–5.1)
ALP SERPL-CCNC: 62 U/L (ref 37–153)
ALT SERPL-CCNC: 27 U/L (ref 6–29)
ANION GAP SERPL CALCULATED.4IONS-SCNC: 7 MMOL/L (CALC) (ref 7–17)
AST SERPL-CCNC: 24 U/L (ref 10–35)
BILIRUB SERPL-MCNC: 0.9 MG/DL (ref 0.2–1.2)
BUN SERPL-MCNC: 15 MG/DL (ref 7–25)
CALCIUM SERPL-MCNC: 9.4 MG/DL (ref 8.6–10.4)
CHLORIDE SERPL-SCNC: 104 MMOL/L (ref 98–110)
CHOLEST SERPL-MCNC: 167 MG/DL
CHOLEST/HDLC SERPL: 2.1 (CALC)
CO2 SERPL-SCNC: 28 MMOL/L (ref 20–32)
CREAT SERPL-MCNC: 0.82 MG/DL (ref 0.6–1)
EGFRCR SERPLBLD CKD-EPI 2021: 73 ML/MIN/1.73M2
GLUCOSE SERPL-MCNC: 81 MG/DL (ref 65–99)
HDLC SERPL-MCNC: 79 MG/DL
LDLC SERPL CALC-MCNC: 74 MG/DL (CALC)
NONHDLC SERPL-MCNC: 88 MG/DL (CALC)
POTASSIUM SERPL-SCNC: 4.8 MMOL/L (ref 3.5–5.3)
PROT SERPL-MCNC: 6.7 G/DL (ref 6.1–8.1)
SODIUM SERPL-SCNC: 139 MMOL/L (ref 135–146)
TRIGL SERPL-MCNC: 59 MG/DL

## 2025-05-20 ENCOUNTER — TELEPHONE (OUTPATIENT)
Dept: DERMATOLOGY | Facility: CLINIC | Age: 80
End: 2025-05-20
Payer: MEDICARE

## 2025-06-09 ENCOUNTER — PATIENT MESSAGE (OUTPATIENT)
Dept: PRIMARY CARE | Facility: CLINIC | Age: 80
End: 2025-06-09
Payer: MEDICARE

## 2025-06-09 DIAGNOSIS — K58.9 IRRITABLE BOWEL SYNDROME, UNSPECIFIED TYPE: Primary | ICD-10-CM

## 2025-06-23 ENCOUNTER — APPOINTMENT (OUTPATIENT)
Dept: DERMATOLOGY | Facility: CLINIC | Age: 80
End: 2025-06-23
Payer: MEDICARE

## 2025-06-23 DIAGNOSIS — Z85.828 HISTORY OF NONMELANOMA SKIN CANCER: ICD-10-CM

## 2025-06-23 DIAGNOSIS — L82.0 INFLAMED SEBORRHEIC KERATOSIS: ICD-10-CM

## 2025-06-23 DIAGNOSIS — D18.01 HEMANGIOMA OF SKIN: ICD-10-CM

## 2025-06-23 DIAGNOSIS — L82.1 SEBORRHEIC KERATOSIS: ICD-10-CM

## 2025-06-23 DIAGNOSIS — L71.9 ROSACEA: ICD-10-CM

## 2025-06-23 DIAGNOSIS — D48.5 NEOPLASM OF UNCERTAIN BEHAVIOR OF SKIN: Primary | ICD-10-CM

## 2025-06-23 DIAGNOSIS — L57.0 ACTINIC KERATOSIS: ICD-10-CM

## 2025-06-23 DIAGNOSIS — D22.5 MELANOCYTIC NEVUS OF TRUNK: ICD-10-CM

## 2025-06-23 DIAGNOSIS — L57.8 DIFFUSE PHOTODAMAGE OF SKIN: ICD-10-CM

## 2025-06-23 PROCEDURE — 11301 SHAVE SKIN LESION 0.6-1.0 CM: CPT | Performed by: DERMATOLOGY

## 2025-06-23 PROCEDURE — 1159F MED LIST DOCD IN RCRD: CPT | Performed by: DERMATOLOGY

## 2025-06-23 PROCEDURE — 99214 OFFICE O/P EST MOD 30 MIN: CPT | Performed by: DERMATOLOGY

## 2025-06-23 PROCEDURE — 17003 DESTRUCT PREMALG LES 2-14: CPT | Performed by: DERMATOLOGY

## 2025-06-23 PROCEDURE — 17110 DESTRUCTION B9 LES UP TO 14: CPT | Performed by: DERMATOLOGY

## 2025-06-23 PROCEDURE — 17000 DESTRUCT PREMALG LESION: CPT | Performed by: DERMATOLOGY

## 2025-06-23 NOTE — Clinical Note
Rosacea -flare on face; papulo-pustular type.  The chronic and intermittently flaring nature of this condition and treatment options were discussed extensively with the patient today.  At this time, I recommend topical therapy with MetroCream 0.75%, which the patient was instructed to apply twice daily to the affected areas of the face.  I also discussed the various triggers of rosacea with the patient today, especially sun exposure, and emphasized the importance of trigger avoidance, particularly sun avoidance and sun protection with daily sunscreen use.  The risks, benefits, and side effects of this medication were discussed.  The patient expressed understanding and is in agreement with this plan.

## 2025-06-23 NOTE — Clinical Note
On the patient's left upper abdomen, there are 2 similar-appearing 1 cm and 7 mm erythematous and light brown-colored, hyperkeratotic, stuck-on appearing papules each with a surrounding rim of erythema

## 2025-06-23 NOTE — PROGRESS NOTES
Subjective     Jeanette Del Toro is a 79 y.o. female who presents for the following: Skin Check.  She notes 2 brown, raised, rough bumps on her left abdomen, which have been present for several months and have been itching recently, especially when they catch on her clothing.  They have not changed in any other way, including in size, shape, or color, and they do not hurt or bleed.  She also notes intermittent pimple breakouts on her nose and states she does not use MetroCream or any other treatment regularly.  She denies any other new, changing, or concerning skin lesions since her last visit; no bleeding, itching, or burning lesions.      Review of Systems:  No other skin or systemic complaints other than what is documented elsewhere in the note.    The following portions of the chart were reviewed this encounter and updated as appropriate:       Skin Cancer History  Biopsy Log Book  No skin cancers from Specimen Tracking.    Additional History      Specialty Problems          Dermatology Problems    History of skin cancer    Rosacea, unspecified    Seborrheic keratoses       Past Dermatologic / Past Relevant Medical History:    - history of BCC on right temporal hairline diagnosed on 5/17/21 s/p Mohs surgery by Dr. Madden on 7/27/21  - BCC on left temporal hairline diagnosed at initial visit on 2/1/21 s/p Mohs surgery by Dr. 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 Johnson on 11/18/22  - compound melanocytic neoplasm in actinically damaged skin on right lateral lower cheek diagnosed on 1/9/23 s/p re-excision with 5-mm margins by Dr. Johnson on 2/17/23  - mildly dysplastic junctional nevus on right posterior  lateral proximal leg diagnosed on 7/17/23 s/p re-shave on 1/18/24  - no history of melanoma     Family History:    Parents and siblings - nonmelanoma skin cancers  No family history of melanoma    Social History:    The patient is retired from working as an  in [employer] and then at [employer]; she was seen with her  in the office in the past    Allergies:  Terbinafine    Current Medications / CAM's:  Current Medications[1]     Objective   Well appearing patient in no apparent distress; mood and affect are within normal limits.    A full examination was performed including scalp, face, eyes, ears, nose, lips, neck, chest, axillae, abdomen, back, bilateral upper extremities, and bilateral lower extremities. All findings within normal limits unless otherwise noted below.    Assessment/Plan   Skin Exam  1. NEOPLASM OF UNCERTAIN BEHAVIOR OF SKIN  Left Lateral Proximal Arm  6 mm dark brown pigmented, asymmetric macule with an asymmetric pigment network and irregular borders     Shave removal    Lesion length (cm):  0.6  Margin per side (cm):  0.2  Lesion diameter (cm):  1  Informed consent: discussed and consent obtained    Timeout: patient name, date of birth, surgical site, and procedure verified    Procedure prep:  Patient was prepped and draped  Anesthesia: the lesion was anesthetized in a standard fashion    Anesthetic:  1% lidocaine w/ epinephrine 1-100,000 local infiltration  Instrument used: flexible razor blade    Hemostasis achieved with: aluminum chloride    Outcome: patient tolerated procedure well    Post-procedure details: sterile dressing applied and wound care instructions given    Dressing type: bandage and petrolatum      Staff Communication: Dermatology Local Anesthesia: 1 % Lidocaine / Epinephrine - Amount:0.5ml  Specimen 1 - Dermatopathology- DERM LAB  Differential Diagnosis: DN  Check Margins Yes/No?:    Comments:    Dermpath Lab: Routine Histopathology  (formalin-fixed tissue)  2. INFLAMED SEBORRHEIC KERATOSIS (2)  Left Abdomen (side) - Upper (2)  On the patient's left upper abdomen, there are 2 similar-appearing 1 cm and 7 mm erythematous and light brown-colored, hyperkeratotic, stuck-on appearing papules each with a surrounding rim of erythema  Inflamed Seborrheic Keratoses -left upper abdomen.  The benign nature of these lesions was discussed with the patient today and reassurance provided.  Given the history the patient provides of frequent irritation and associated symptoms as well as their inflamed appearance on exam today, I offered to treat these 2 lesions with liquid nitrogen cryotherapy.  The patient expressed understanding, is in agreement with this plan, and wishes to proceed with cryotherapy today.  Destr of lesion - Left Abdomen (side) - Upper (2)  Complexity: simple    Destruction method: cryotherapy    Informed consent: discussed and consent obtained    Lesion destroyed using liquid nitrogen: Yes    Cryotherapy cycles:  2  Outcome: patient tolerated procedure well with no complications    Post-procedure details: wound care instructions given    3. ACTINIC KERATOSIS (2)  Head - Anterior (Face) (2)  On her right nasal tip and left temple, there are 2 erythematous, gritty, scaly macules   Actinic Keratoses -right nasal tip and left temple.  The pre-cancerous nature of these lesions and treatment options were discussed with the patient today.  At this time, I recommend treatment with liquid nitrogen cryotherapy.  The patient expressed understanding, is in agreement with this plan, and wishes to proceed with cryotherapy today.  Destr of lesion - Head - Anterior (Face) (2)  Complexity: simple    Destruction method: cryotherapy    Informed consent: discussed and consent obtained    Lesion destroyed using liquid nitrogen: Yes    Cryotherapy cycles:  1  Outcome: patient tolerated procedure well with no complications    Post-procedure details: wound care  instructions given    4. MELANOCYTIC NEVUS OF TRUNK  Generalized  Scattered on the patient's face, neck, trunk, and extremities, there are multiple small, round- to oval-shaped, brown-pigmented and pink-colored, symmetric, uniform-appearing macules and dome-shaped papules  Clinically benign- to slightly atypical-appearing nevi - the clinically benign- to slightly atypical-appearing nature of the remainder of the patient's nevi was discussed with the patient today.  None of the patient's nevi, with the exception of the one noted above, meet threshold for biopsy today.  I emphasized the importance of performing monthly self-skin exams using the ABCDs of monitoring moles, which were reviewed with the patient today and an informational hand-out provided.  I also emphasized the importance of sun avoidance and sun protection with daily sunscreen use.  5. SEBORRHEIC KERATOSIS  Generalized  Scattered on the patient's face, neck, trunk, and extremities, there are multiple tan- to light brown-colored, hyperkeratotic, stuck-on appearing papules of varying size and shape  Seborrheic Keratoses - the benign nature of these lesions was discussed with the patient today and reassurance provided.  No treatment is medically indicated for the noninflamed SKs at this time.  6. HEMANGIOMA OF SKIN  Generalized  Scattered on the patient's face, neck, trunk, and extremities, there are multiple small, round, cherry red- to purplish-colored, symmetric, uniform, vascular-appearing macules and papules  Cherry Angiomas - the benign nature of these vascular lesions was discussed with the patient today and reassurance provided.  No treatment is medically indicated for these lesions at this time.  7. ROSACEA  Head - Anterior (Face)  On the patient's face, most prominent over the bilateral medial cheeks and nose, there is moderate underlying erythema with several overlying telangiectasias and a few scattered erythematous, inflammatory papules    Rosacea -flare on face; papulo-pustular type.  The chronic and intermittently flaring nature of this condition and treatment options were discussed extensively with the patient today.  At this time, I recommend topical therapy with MetroCream 0.75%, which the patient was instructed to apply twice daily to the affected areas of the face.  I also discussed the various triggers of rosacea with the patient today, especially sun exposure, and emphasized the importance of trigger avoidance, particularly sun avoidance and sun protection with daily sunscreen use.  The risks, benefits, and side effects of this medication were discussed.  The patient expressed understanding and is in agreement with this plan.  8. HISTORY OF NONMELANOMA SKIN CANCER  Generalized  On the patient's right temporal hairline, left temporal hairline, right jawline, right lateral lower cheek, and right posterior lateral proximal leg, there are well-healed scars with no evidence of recurrent growth on exam today.  History of basal cell carcinomas, dysplastic nevi, and actinic keratoses and photodamage.  There is no evidence of recurrence on exam today.  The signs and symptoms of skin cancer were reviewed and the patient was advised to practice sun protection and sun avoidance, use daily sunscreen, and perform regular self skin exams.  I will have the patient return to our office in 6 to 12 months, pending the above biopsy result, for routine follow-up and skin exam, and the patient was instructed to call our office should the patient notice any new, changing, symptomatic, or otherwise concerning skin lesions before then.  The patient expressed understanding and is in agreement with this plan.  9. DIFFUSE PHOTODAMAGE OF SKIN  Photodistributed  Diffuse photodamage with actinic changes with telangiectasia and mottled pigmentation in sun-exposed areas.  Photodamage.  The signs and symptoms of skin cancer were reviewed and the patient was advised to  practice sun protection and sun avoidance, use daily sunscreen, and perform regular self skin exams.  Sun protection was discussed, including avoiding the mid-day sun, wearing a sunscreen with SPF at least 50, and stressing the need for reapplication of sunscreen and applying more than they think they need.          [1]   Current Outpatient Medications:     alendronate (Fosamax) 70 mg tablet, Take 1 tablet (70 mg) by mouth every 7 days. Take in the morning with a full glass of water, on an empty stomach, and do not take anything else by mouth or lie down for the next 30 min., Disp: 12 tablet, Rfl: 3    atorvastatin (Lipitor) 10 mg tablet, Take 1 tablet (10 mg) by mouth once daily., Disp: 90 tablet, Rfl: 3    biotin 10 mg tablet, Take by mouth., Disp: , Rfl:     calcium-D3-mag oxide-C-K2-min 333 mg-8.3 mcg-116.7 mg capsule, Take 1 capsule by mouth 2 times a day., Disp: , Rfl:     cetirizine (ZyrTEC) 10 mg capsule, Take by mouth., Disp: , Rfl:     cycloSPORINE (Restasis) 0.05 % ophthalmic emulsion, , Disp: , Rfl:     DOCOSAHEXAENOIC ACID ORAL, Take by mouth once daily., Disp: , Rfl:     Incruse Ellipta 62.5 mcg/actuation inhalation, USE 1 INHALATION ORALLY    ONCE DAILY AS INSTRUCTED, Disp: , Rfl:

## 2025-06-23 NOTE — Clinical Note
Actinic Keratoses -right nasal tip and left temple.  The pre-cancerous nature of these lesions and treatment options were discussed with the patient today.  At this time, I recommend treatment with liquid nitrogen cryotherapy.  The patient expressed understanding, is in agreement with this plan, and wishes to proceed with cryotherapy today.

## 2025-06-23 NOTE — Clinical Note
Inflamed Seborrheic Keratoses -left upper abdomen.  The benign nature of these lesions was discussed with the patient today and reassurance provided.  Given the history the patient provides of frequent irritation and associated symptoms as well as their inflamed appearance on exam today, I offered to treat these 2 lesions with liquid nitrogen cryotherapy.  The patient expressed understanding, is in agreement with this plan, and wishes to proceed with cryotherapy today.

## 2025-06-23 NOTE — Clinical Note
Kang Angiomas - the benign nature of these vascular lesions was discussed with the patient today and reassurance provided.  No treatment is medically indicated for these lesions at this time.

## 2025-06-23 NOTE — Clinical Note
On the patient's right temporal hairline, left temporal hairline, right jawline, right lateral lower cheek, and right posterior lateral proximal leg, there are well-healed scars with no evidence of recurrent growth on exam today.

## 2025-06-23 NOTE — Clinical Note
History of basal cell carcinomas, dysplastic nevi, and actinic keratoses and photodamage.  There is no evidence of recurrence on exam today.  The signs and symptoms of skin cancer were reviewed and the patient was advised to practice sun protection and sun avoidance, use daily sunscreen, and perform regular self skin exams.  I will have the patient return to our office in 6 to 12 months, pending the above biopsy result, for routine follow-up and skin exam, and the patient was instructed to call our office should the patient notice any new, changing, symptomatic, or otherwise concerning skin lesions before then.  The patient expressed understanding and is in agreement with this plan.

## 2025-06-26 ENCOUNTER — APPOINTMENT (OUTPATIENT)
Dept: DERMATOLOGY | Facility: CLINIC | Age: 80
End: 2025-06-26
Payer: MEDICARE

## 2025-07-02 ENCOUNTER — TELEPHONE (OUTPATIENT)
Dept: DERMATOLOGY | Facility: CLINIC | Age: 80
End: 2025-07-02
Payer: MEDICARE

## 2025-09-26 ENCOUNTER — APPOINTMENT (OUTPATIENT)
Dept: DERMATOLOGY | Facility: CLINIC | Age: 80
End: 2025-09-26
Payer: MEDICARE

## 2025-10-28 ENCOUNTER — APPOINTMENT (OUTPATIENT)
Dept: GASTROENTEROLOGY | Facility: CLINIC | Age: 80
End: 2025-10-28
Payer: MEDICARE

## 2025-12-11 ENCOUNTER — APPOINTMENT (OUTPATIENT)
Dept: PRIMARY CARE | Facility: CLINIC | Age: 80
End: 2025-12-11
Payer: MEDICARE

## 2026-06-29 ENCOUNTER — APPOINTMENT (OUTPATIENT)
Dept: DERMATOLOGY | Facility: CLINIC | Age: 81
End: 2026-06-29
Payer: MEDICARE